# Patient Record
Sex: FEMALE | Race: WHITE | NOT HISPANIC OR LATINO | Employment: FULL TIME | URBAN - METROPOLITAN AREA
[De-identification: names, ages, dates, MRNs, and addresses within clinical notes are randomized per-mention and may not be internally consistent; named-entity substitution may affect disease eponyms.]

---

## 2016-06-13 LAB
PAP RECOMMENDATION EXT: NORMAL
PAP SMEAR: NORMAL

## 2018-01-28 ENCOUNTER — HOSPITAL ENCOUNTER (OUTPATIENT)
Dept: OCCUPATIONAL THERAPY | Facility: HOSPITAL | Age: 23
End: 2018-01-29
Attending: INTERNAL MEDICINE | Admitting: INTERNAL MEDICINE

## 2018-01-28 LAB
ABS NEUT (OLG): 5.42 X10(3)/MCL (ref 2.1–9.2)
ALBUMIN SERPL-MCNC: 3.3 GM/DL (ref 3.4–5)
ALBUMIN/GLOB SERPL: 0.9 {RATIO}
ALP SERPL-CCNC: 115 UNIT/L (ref 38–126)
ALT SERPL-CCNC: 18 UNIT/L (ref 12–78)
AST SERPL-CCNC: 23 UNIT/L (ref 15–37)
BASOPHILS # BLD AUTO: 0 X10(3)/MCL (ref 0–0.2)
BASOPHILS NFR BLD AUTO: 0 %
BILIRUB SERPL-MCNC: 0.5 MG/DL (ref 0.2–1)
BILIRUBIN DIRECT+TOT PNL SERPL-MCNC: 0.1 MG/DL (ref 0–0.2)
BILIRUBIN DIRECT+TOT PNL SERPL-MCNC: 0.4 MG/DL (ref 0–0.8)
BNP BLD-MCNC: 8 PG/ML (ref 0–125)
BUN SERPL-MCNC: 6 MG/DL (ref 7–18)
CALCIUM SERPL-MCNC: 8 MG/DL (ref 8.5–10.1)
CHLORIDE SERPL-SCNC: 102 MMOL/L (ref 98–107)
CO2 SERPL-SCNC: 21 MMOL/L (ref 21–32)
CREAT SERPL-MCNC: 0.66 MG/DL (ref 0.55–1.02)
EOSINOPHIL # BLD AUTO: 0.5 X10(3)/MCL (ref 0–0.9)
EOSINOPHIL NFR BLD AUTO: 6 %
ERYTHROCYTE [DISTWIDTH] IN BLOOD BY AUTOMATED COUNT: 12 % (ref 11.5–17)
FLUAV AG NPH QL IA: NEGATIVE
FLUBV AG NPH QL IA: NEGATIVE
GLOBULIN SER-MCNC: 3.8 GM/DL (ref 2.4–3.5)
GLUCOSE SERPL-MCNC: 87 MG/DL (ref 74–106)
HCT VFR BLD AUTO: 37.5 % (ref 37–47)
HGB BLD-MCNC: 12.6 GM/DL (ref 12–16)
LYMPHOCYTES # BLD AUTO: 2 X10(3)/MCL (ref 0.6–4.6)
LYMPHOCYTES NFR BLD AUTO: 23 %
MCH RBC QN AUTO: 31.8 PG (ref 27–31)
MCHC RBC AUTO-ENTMCNC: 33.6 GM/DL (ref 33–36)
MCV RBC AUTO: 94.7 FL (ref 80–94)
MONOCYTES # BLD AUTO: 0.7 X10(3)/MCL (ref 0.1–1.3)
MONOCYTES NFR BLD AUTO: 8 %
NEUTROPHILS # BLD AUTO: 5.42 X10(3)/MCL (ref 1.4–7.9)
NEUTROPHILS NFR BLD AUTO: 62 %
PLATELET # BLD AUTO: 259 X10(3)/MCL (ref 130–400)
PMV BLD AUTO: 9.7 FL (ref 9.4–12.4)
POC BETA-HCG (QUAL): NEGATIVE
POTASSIUM SERPL-SCNC: 3.3 MMOL/L (ref 3.5–5.1)
PROT SERPL-MCNC: 7.1 GM/DL (ref 6.4–8.2)
RBC # BLD AUTO: 3.96 X10(6)/MCL (ref 4.2–5.4)
SODIUM SERPL-SCNC: 134 MMOL/L (ref 136–145)
WBC # SPEC AUTO: 8.8 X10(3)/MCL (ref 4.5–11.5)

## 2018-01-29 LAB
ALBUMIN SERPL-MCNC: 3.2 GM/DL (ref 3.4–5)
ALBUMIN/GLOB SERPL: 1 {RATIO}
ALP SERPL-CCNC: 112 UNIT/L (ref 38–126)
ALT SERPL-CCNC: 22 UNIT/L (ref 12–78)
AST SERPL-CCNC: 21 UNIT/L (ref 15–37)
BILIRUB SERPL-MCNC: 0.2 MG/DL (ref 0.2–1)
BILIRUBIN DIRECT+TOT PNL SERPL-MCNC: 0.1 MG/DL (ref 0–0.2)
BILIRUBIN DIRECT+TOT PNL SERPL-MCNC: 0.1 MG/DL (ref 0–0.8)
BUN SERPL-MCNC: 13 MG/DL (ref 7–18)
CALCIUM SERPL-MCNC: 8.9 MG/DL (ref 8.5–10.1)
CHLORIDE SERPL-SCNC: 107 MMOL/L (ref 98–107)
CO2 SERPL-SCNC: 20 MMOL/L (ref 21–32)
CREAT SERPL-MCNC: 0.61 MG/DL (ref 0.55–1.02)
ERYTHROCYTE [DISTWIDTH] IN BLOOD BY AUTOMATED COUNT: 12.4 % (ref 11.5–17)
GLOBULIN SER-MCNC: 3.3 GM/DL (ref 2.4–3.5)
GLUCOSE SERPL-MCNC: 165 MG/DL (ref 74–106)
HCT VFR BLD AUTO: 36.4 % (ref 37–47)
HGB BLD-MCNC: 12.3 GM/DL (ref 12–16)
MCH RBC QN AUTO: 32.2 PG (ref 27–31)
MCHC RBC AUTO-ENTMCNC: 33.8 GM/DL (ref 33–36)
MCV RBC AUTO: 95.3 FL (ref 80–94)
PLATELET # BLD AUTO: 262 X10(3)/MCL (ref 130–400)
PMV BLD AUTO: 10.2 FL (ref 9.4–12.4)
POTASSIUM SERPL-SCNC: 5 MMOL/L (ref 3.5–5.1)
PROT SERPL-MCNC: 6.5 GM/DL (ref 6.4–8.2)
RBC # BLD AUTO: 3.82 X10(6)/MCL (ref 4.2–5.4)
SODIUM SERPL-SCNC: 137 MMOL/L (ref 136–145)
WBC # SPEC AUTO: 8.7 X10(3)/MCL (ref 4.5–11.5)

## 2022-04-30 NOTE — DISCHARGE SUMMARY
Patient:   Roseanne Donald            MRN: [id number]            FIN: [id number]               Age:   22 years     Sex:  Female     :  1995   Associated Diagnoses:   None   Author:   Romario LYLE, Precious Andres      Results Review   General results   Today's results   2018 3:52 CST       WBC                       8.7 x10(3)/mcL                             RBC                       3.82 x10(6)/mcL  LOW                             Hgb                       12.3 gm/dL                             Hct                       36.4 %  LOW                             Platelet                  262 x10(3)/mcL                             MCV                       95.3 fL  HI                             MCH                       32.2 pg  HI                             MCHC                      33.8 gm/dL                             RDW                       12.4 %                             MPV                       10.2 fL                             Sodium Lvl                137 mmol/L                             Potassium Lvl             5.0 mmol/L                             Chloride                  107 mmol/L                             CO2                       20.0 mmol/L  LOW                             Calcium Lvl               8.9 mg/dL                             Glucose Lvl               165 mg/dL  HI                             BUN                       13.0 mg/dL                             Creatinine                0.61 mg/dL                             eGFR-AA                   >60 mL/min/1.73 m2  NA                             eGFR-ANNA MARIE                  >60 mL/min/1.73 m2  NA                             Bili Total                0.2 mg/dL                             Bili Direct               0.10 mg/dL                             Bili Indirect             0.10 mg/dL                             AST                       21 unit/L                             ALT                       22  "unit/L                             Alk Phos                  112 unit/L                             Total Protein             6.5 gm/dL                             Albumin Lvl               3.20 gm/dL  LOW                             Globulin                  3.30 gm/dL                             A/G Ratio                 1.0  NA        Discharge Information   Discharge Summary Information:  Admitted  1/28/2018, Discharged  1/29/2018.       Physical Examination      Vital Signs (last 24 hrs)_____  Last Charted___________  Temp Oral     36.5 DegC  (JAN 29 07:00)  Heart Rate Peripheral   89 bpm  (JAN 29 08:28)  Resp Rate         18 br/min  (JAN 29 07:00)  SBP      100 mmHg  (JAN 29 07:00)  DBP      62 mmHg  (JAN 29 07:00)  SpO2      94 %  (JAN 29 08:20)     General:  Alert and oriented, no acute distress,  Neck:  Supple, Non-tender.    Respiratory:   CTA bilateraly for most parts except some scattered wheeze  Cardiovascular:  Normal rate, Regular rhythm, S1, S2, No gallop, Normal peripheral perfusion.    Gastrointestinal:  Soft, Non-tender, Non-distended, Normal bowel sounds.    Musculoskeletal:  Normal range of motion, Normal strength, No tenderness, No swelling.    Integumentary:  Warm, Dry, Pink.    Neurologic:  Alert, Oriented, Normal sensory, Normal motor function, No focal deficits.          Hospital Course   22 year old  female presents to the ED with reports of difficulty breathing and associated chest tightness and wheezing. Pt has a history of asthma and her symptoms started a day ago. she uses albuterol inhaler which she reports she is "running out". She has no PCP or routine follow up. She comes to the ED for her asthma exacerbations. Labs done in the ED revealed Sodium 134, potassium 3.3, chloride 102; CO2 21.0, glucose 87, BUN 6.0, creatinine 0.66; albumin 3.30, BNP 8, WBCs 8.8, H&H 12.6/37.5, platelets 259.  Influenza A swab and influenza B swab both done in the ED negative.  Chest x-ray done " in the ED revealed no active pulmonary disease.  Patient was given multiple nebulizer treatments as well as IV steroid therapy which provided little relief.  Patient had some hypoxia with O2 saturations dropping into 90% which she was placed on supplemental oxygen therapy which improved her saturations to 9596 percent. She does reports sinus congestion, productive cough with intermittent white sputum.  Patient is admitted to the hospitalist services for further management.  She was continued on IV Solu-Medrol and duo nebs.  Had a significant improvement in her respiratory status and is eager to go home.  She actually can be discharged on a slow prednisone taper.  Will also be given prescription for her nebulizer treatments which she had not been taking because of some lapse in her Medicaid card.  This seems to have resolved and should be able to fill prescriptions now with Medicaid assistance.  Overall she is feeling good and should be discharging home on a slow prednisone taper.  Urged at length on the importance of staying away from secondhand smoke or from smoking.    Time spent: 34 minutes           Discharge Plan   Discharge Summary Plan   Discharge Status: improved.     Discharge instructions given: to patient.     Discharge disposition: discharge to home (into the care of family member, self care).     Prescriptions: continue same medications, written and given to patient, per med rec sheet.     Orders     Orders   Admit/Transfer/Discharge:  Discharge (Order): Home  :  Consult Case Management (Order): 1/29/2018 9:38 CST, PCP set up.     Orders   Pharmacy:  prednisONE 10 mg oral tablet (Prescribe): 10 mg = 1 tab(s), Oral, Daily, 40 mg p.o. twice daily ×2 days  30 mg p.o. twice daily ×2 days  20 mg p.o. twice daily ×2 days  10 mg p.o. twice daily ×2 days  5 mg p.o. twice daily ×2 days then stop, X 10 day(s), # 42 tab(s), 0 Refill(s)  DuoNeb 0.5 mg-2.5 mg/3 mL inhalation solution (Prescribe): 3 mL,  NEB, q4hr, X 30 day(s), # 540 mL, 2 Refill(s)  albuterol CFC free 90 mcg/inh inhalation aerosol with adapter (Discontinue): 1/29/2018 9:40 CST.     Acute asthma exacerbation  -will write a Rx for neb treatments  - PO steroid taper,s low    Respiratory failure - with hypoxia  -resolved     Hyponatremia  -resolved with IVF     Hypokalemia: improved   -potassium chloride 20 meq PO BID x 1 days      Tobacco abuse  -counseled  -recenty quit smoking 3 weeks ago  -urged importance of smoking cessation      Education and Follow-up   Counseled: patient, regarding diagnosis, regarding treatment, regarding medications.     Discharge Planning: Asthma, Adult, Asthma Attack Prevention, Curtis Hagen 2/12/2018 09:45:00.

## 2022-04-30 NOTE — ED PROVIDER NOTES
"   Patient:   Roseanne Donald            MRN: [id number]            FIN: [id number]               Age:   22 years     Sex:  Female     :  1995   Associated Diagnoses:   Asthma exacerbation   Author:   Rajendra LYLE, Sara Boyce      Basic Information   Time seen: Date & time 2018 05:07:00.   History source: Patient.   Arrival mode: Private vehicle, walking.   History limitation: None.   Additional information: Chief Complaint from Nursing Triage Note : Chief Complaint   2018 4:46 CST       Chief Complaint           Complaint of SOB, wheezing.  States had been out of town, did not have her nebulizer with her.  Bilateral wheezing auscultated.  .      History of Present Illness   The patient presents with 21 y/o CF with hx of asthma and anxiety presents to ED c/o wheezing onset 1 day ago. Patent reports associated SOB and chest tightness which feels like an asthma exacerbation. CP also similar to when she has "panic attacks." She also reports associated nasal congestion, productive cough of white sputum. Patient states that she took her inhaler 1 hour ago w/ no relief. Patient denies sinus pain, sore throat, fever, sick contacts or recent antibiotic therapy. She has no history of VTE and is not on birth control or other hormone therapy. Patient states that she did not receive her Flu shot this year. No signs of bleeding. No new foods, medications or environment. No other acute issues. Of note, she quit smoking 3 weeks ago. She has never been hospitalized or intubated for asthma.  The onset was 1  days ago.  The course/duration of symptoms is constant.  The degree at onset was moderate.  The degree at present is moderate.  The exacerbating factor is none.  The relieving factor is none.  Risk factors consist of asthma.  Prior episodes: chronic.  Therapy today: beta-agonist inhaler.  Associated symptoms: chest pain, shortness of breath, productive cough, Nasal congestion, denies fever, denies " nausea, denies vomiting and denies dry cough.        Review of Systems   Constitutional symptoms:  Denies birth control and hormone therapy, No fever,    Skin symptoms:  No rash,    Eye symptoms:  No recent vision problems,    ENMT symptoms:  Nasal congestion, no sore throat, no sinus pain.    Respiratory symptoms:  Shortness of breath, cough, wheezing, No hemoptysis, , Sputum production: white.    Cardiovascular symptoms:  Chest pain, no syncope, no peripheral edema.    Gastrointestinal symptoms:  No abdominal pain, no nausea, no vomiting, no diarrhea, no constipation, no rectal bleeding.    Genitourinary symptoms:  No dysuria, no hematuria, no vaginal bleeding.    Musculoskeletal symptoms:  No back pain,    Neurologic symptoms:  No headache, no altered level of consciousness.       Health Status   Allergies: No known allergies.   Medications:  (Selected)   Inpatient Medications  Ordered  Vistaril: 50 mg, form: Injection, IM, Once, first dose 05/12/17 17:01:00 CDT, stop date 05/12/17 17:01:00 CDT, STAT, IM Admin. Only, 24  proparacaine 0.5% ophthalmic solution: 2 drop(s), form: Soln-Opth, OPTH, Once, first dose 07/12/16 21:26:00 CDT, stop date 07/12/16 21:26:00 CDT, STAT  Prescriptions  Prescribed  albuterol CFC free 90 mcg/inh inhalation aerosol with adapter: 2 puff(s), INH, q4hr, PRN PRN for wheezing, # 1 EA, 5 Refill(s), Pharmacy: [id number].   Immunizations: Flu not up to date.      Past Medical/ Family/ Social History   Medical history:    Active  Asthma ([id number]): Onset in 2001 at 6 years.  Resolved  Pregnant ([id number]):  Resolved on 8/19/2013 at 18 years., Reviewed as documented in chart, anxiety, panic attack disorder.   Surgical history: Negative.   Family history:    Asthma  Father  Hypertension  Father  Glaucoma  Father  , Reviewed as documented in chart.   Social history: Alcohol use: Occasionally, Tobacco use: Quit 3 weeks ago, Drug use: Denies, Family/social situation: Intact  family.      Physical Examination               Vital Signs   Vital Signs   1/28/2018 4:46 CST       Temperature Oral          37.4 DegC                             Temperature Oral (calculated)             99.32 DegF                             Peripheral Pulse Rate     105 bpm  HI                             Respiratory Rate          20 br/min                             SpO2                      99 %                             Oxygen Therapy            Room air                             Systolic Blood Pressure   104 mmHg                             Diastolic Blood Pressure  65 mmHg  .   Measurements   1/28/2018 4:46 CST       Weight Dosing             51.5 kg                             Weight Measured and Calculated in Lbs     113.54 lb                             Weight Estimated          51.5 kg                             Height/Length Dosing      163 cm                             Height/Length Estimated   163 cm                             Body Mass Index Estimated 19.38 kg/m2  .   Basic Oxygen Information   1/28/2018 4:46 CST       SpO2                      99 %                             Oxygen Therapy            Room air  .   General:  Alert, mild distress, Patient has mildly labored respirations with audible wheezing. +accessory muscle use.    Skin:  Warm, dry, pink, intact, no pallor, no rash.    Head:  Normocephalic, atraumatic.    Neck:  Supple, trachea midline.    Eye:  Extraocular movements are intact, normal conjunctiva.    Ears, nose, mouth and throat:  Oral mucosa moist, no pharyngeal erythema or exudate, No TTP to the sinuses.    Cardiovascular:  Regular rate and rhythm, Normal peripheral perfusion, No edema.    Respiratory:  Breath sounds are equal, Symmetrical chest wall expansion, Respirations: Regular, respiratory distress mild, labored, prolonged, Breath sounds: Bilateral, wheezes present (inspiratory wheezes, expiratory wheezes, diffuse), Retractions: Mild, Clubbing of nails: None.     Gastrointestinal:  Soft, Nontender, Non distended, Normal bowel sounds.    Genitourinary:  No tenderness.   Back:  Normal range of motion.   Musculoskeletal:  Normal ROM, normal strength, No calf edema, asymmetry, erythema, warmth, tenderness to palpation or palpable cords appreciated bilaterally.    Neurological:  Alert and oriented to person, place, time, and situation, No focal neurological deficit observed, normal motor observed.    Psychiatric:  Cooperative.      Medical Decision Making   Differential Diagnosis:  Wheezing, asthma exacerbation, bronchitis, upper respiratory infection, anaphylaxis, pneumonia, congestive heart failure, smoke inhalation.    Rationale:  21 yo F with history of asthma, reportedly quit smoking, having difficulty finding primary care for asthma management presents with acute ashtma exacerbation. She is afebrile, hemodynamically stable with diffuse wheezing, No evidence of DVT and she is not pale. CXR, Rapid flu, duoneb and solu medrol given. Reassess.   Documents reviewed:  Emergency department nurses' notes.   Electrocardiogram:  Time 1/28/2018 05:28:00, rate 107, normal sinus rhythm, No ST-T changes, no ectopy, normal PA & QRS intervals, EP Interp.    Results review:  Lab results : Lab View   1/28/2018 5:45 CST       Sodium Lvl                134 mmol/L  LOW                             Potassium Lvl             3.3 mmol/L  LOW                             Chloride                  102 mmol/L                             CO2                       21.0 mmol/L                             Calcium Lvl               8.0 mg/dL  LOW                             Glucose Lvl               87 mg/dL                             BUN                       6.0 mg/dL  LOW                             Creatinine                0.66 mg/dL                             eGFR-AA                   >60 mL/min/1.73 m2  NA                             eGFR-ANNA MARIE                  >60 mL/min/1.73 m2  NA                              Bili Total                0.5 mg/dL                             Bili Direct               0.10 mg/dL                             Bili Indirect             0.40 mg/dL                             AST                       23 unit/L                             ALT                       18 unit/L                             Alk Phos                  115 unit/L                             Total Protein             7.1 gm/dL                             Albumin Lvl               3.30 gm/dL  LOW                             Globulin                  3.80 gm/dL  HI                             A/G Ratio                 0.9  NA                             BNP                       8 pg/mL                             WBC                       8.8 x10(3)/mcL                             RBC                       3.96 x10(6)/mcL  LOW                             Hgb                       12.6 gm/dL                             Hct                       37.5 %                             Platelet                  259 x10(3)/mcL                             MCV                       94.7 fL  HI                             MCH                       31.8 pg  HI                             MCHC                      33.6 gm/dL                             RDW                       12.0 %                             MPV                       9.7 fL                             Abs Neut                  5.42 x10(3)/mcL                             Neutro Auto               62 %  NA                             Lymph Auto                23 %  NA                             Mono Auto                 8 %  NA                             Eos Auto                  6 %  NA                             Abs Eos                   0.5 x10(3)/mcL                             Basophil Auto             0 %  NA                             Abs Neutro                5.42 x10(3)/mcL                             Abs Lymph                 2.0 x10(3)/mcL                              Abs Mono                  0.7 x10(3)/mcL                             Abs Baso                  0.0 x10(3)/mcL    1/28/2018 5:35 CST       Influ A Ag                Negative                             Influ B Ag                Negative  .   Chest X-Ray:  Time reported 1/28/2018 06:50:00, no acute disease process, interpretation by Emergency Physician.    Radiology results:  Reported at  1/28/2018 08:30:00, X-ray, chest, reviewed radiologist's report, IMPRESSION: No active pulmonary disease  .       Reexamination/ Reevaluation   Time: 1/28/2018 07:00:00 .   Vital signs   Basic Oxygen Information   1/28/2018 6:45 CST       SpO2                      96 %                             Oxygen Therapy            Room air     Course: improving.   Pain status: unchanged.   Assessment: exam improved, Feels better after duo neb. Still wheezing and feels tight .   Time: 1/28/2018 09:00:00 .   Course: unchanged.   Pain status: unchanged.   Assessment: exam unchanged.   Notes: Patient given 3 duonebs with solu-medrol. Work-up including BNP, rapid flu, and CXR negative for acute findings. Peak flow 250 after treatments which is just over 50% of her suspescted PEF. She is still mildly labred with a cough that sounds productive. Oxygen saturation dropped as low as 90% but is typically higher (94-97%) when she is resting. BP is borderline low, but this is likely her baseline/cuff placement. She was given IVF for hydration. Overall she appears somewhat improved but is still coughing with chest tightness. I will give Magnesium and have consulted hospital medicine for admission for futher management especially consdering she has no PCP follow-up at this time. She is amenble to admssion..      Impression and Plan   Diagnosis   Asthma exacerbation ([id number] J45.901)      Calls-Consults   -  1/28/2018 08:20:00 , Zohreh answered and agrees to admission.    Plan   Condition: Improved, Stable.    Disposition: Admit  time  1/28/2018 08:30:00, Place in Observation Telemetry Unit, Zoey Grace MD.    Counseled: Patient, Regarding diagnosis, Regarding diagnostic results, Regarding treatment plan, Regarding prescription, Patient indicated understanding of instructions.    Notes: IMary acted solely as a scribe for and in the presence of Dr. Drew who performed the service., I, Dr. Sara Drew, personally evaluated and examined this patient and agree with the documentation as above. .

## 2022-04-30 NOTE — H&P
"   Patient:   Roseanne Donald            MRN: [id number]            FIN: [id number]               Age:   22 years     Sex:  Female     :  1995   Associated Diagnoses:   None   Author:   Zoey Grace MD      Basic Information   Source of history:  Self, Medical record.    Present at bedside:  Medical personnel.    Referral source:  Emergency department.    History limitation:  None.    Provider information/ cc:    PCP: NONE  ADVANCED DIRECTIVES: NONE  CODE STATUS: FULL.       Chief Complaint   2018 4:46 CST       Complaint of SOB, wheezing.  States had been out of town, did not have her nebulizer with her.  Bilateral wheezing auscultated.      History of Present Illness   22 year old  female presents to the ED with reports of difficulty breathing and associated chest tightness and wheezing. Pt has a history of asthma and her symptoms started a day ago. she uses albuterol inhaler which she reports she is "running out". She has no PCP or routine follow up. She comes to the ED for her asthma exacerbations. Labs done in the ED revealed Sodium 134, potassium 3.3, chloride 102; CO2 21.0, glucose 87, BUN 6.0, creatinine 0.66; albumin 3.30, BNP 8, WBCs 8.8, H&H 12.6/37.5, platelets 259.  Influenza A swab and influenza B swab both done in the ED negative.  Chest x-ray done in the ED revealed no active pulmonary disease.  Patient was given multiple nebulizer treatments as well as IV steroid therapy which provided little relief.  Patient had some hypoxia with O2 saturations dropping into 90% which she was placed on supplemental oxygen therapy which improved her saturations to 9596 percent. She does reports sinus congestion, productive cough with intermittent white sputum.  Patient is admitted to the hospitalist services for further management.  VS /67 pulse 103 respirations 24 temperature 36.5°C O2 saturation 94% on 2 L per nasal cannula      Review of Systems   Except as document, all " other systems reviewed and negative      Health Status   Allergies:    Allergic Reactions (Selected)  No Known Allergies   Current medications:  (Selected)   Inpatient Medications  Ordered  DuoNeb: 3 mL, form: Soln, NEB, q4hr, first dose 01/28/18 8:59:00 CST  NS (0.9% Sodium Chloride) Infusion 1,000 mL: 1,000 mL, 1,000 mL, IV, 1,000 mL/hr, start date 01/28/18 6:33:00 CST  Norco 5 mg-325 mg oral tablet: 1 tab(s), form: Tab, Oral, q6hr PRN for pain, first dose 01/28/18 8:59:00 CST, mild-moderate  SOLU-Medrol: 60 mg, form: Injection, IV Push, [id number], first dose 01/28/18 9:00:00 CST  Vistaril: 50 mg, form: Injection, IM, Once, first dose 05/12/17 17:01:00 CDT, stop date 05/12/17 17:01:00 CDT, STAT, IM Admin. Only, 24  Zofran: 4 mg, form: Injection, IV Push, q4hr PRN for nausea, first dose 01/28/18 8:59:00 CST  acetaminophen: 1,000 mg, form: Tab, Oral, q6hr PRN for pain, mild, first dose 01/28/18 8:59:00 CST  acetaminophen: 650 mg, form: Tab, Oral, q6hr PRN for fever, first dose 01/28/18 8:59:00 CST, > 38.1 degrees Celsius (100.6 degrees Fahrenheit)  proparacaine 0.5% ophthalmic solution: 2 drop(s), form: Soln-Opth, OPTH, Once, first dose 07/12/16 21:26:00 CDT, stop date 07/12/16 21:26:00 CDT, STAT  Prescriptions  Prescribed  albuterol CFC free 90 mcg/inh inhalation aerosol with adapter: 2 puff(s), INH, q4hr, PRN PRN for wheezing, # 1 EA, 5 Refill(s), Pharmacy: [facility] [id number]      Histories   Past Medical History:   Asthma   Family History:   Father- asthma, HTN,  glaucoma   Procedure history:   Negative   Social History     Drinks beer socially  Denies any illict drug use  Denies any recent tobacco use- quit smoking 3 weeks ago  Lives with family.        Physical Examination      Vital Signs (last 24 hrs)_____  Last Charted___________  Temp Oral     36.6 DegC  (JAN 28 12:00)  Heart Rate Peripheral   94 bpm  (JAN 28 12:00)  Resp Rate         19 br/min  (JAN 28 12:00)  SBP      103 mmHg  (JAN 28  12:00)  DBP      66 mmHg  (JAN 28 12:00)  SpO2      96 %  (JAN 28 12:00)     General:  Alert and oriented, Mild distress, appears stated age, non-toxic, shortness of breath noted.    Eye:  Pupils are equal, round and reactive to light, Extraocular movements are intact, Vision unchanged.    HENT:  Normocephalic, Oral mucosa is moist, No pharyngeal erythema.    Neck:  Supple, Non-tender.    Respiratory:          Respirations: Labored (mild).         Breath sounds: Bilateral, Lower lobe, Base, Diminished.         Breath sounds: Bilateral, Anterior, Posterior, coarse breath sounds.         Breath sounds: Bilateral, Inspiratory wheezes, Expiratory wheezes, audible wheezing.         Support: Oxygen ( 2  L/min ), Oxygen delivery method.    Cardiovascular:  Normal rate, Regular rhythm, S1, S2, No gallop, Normal peripheral perfusion.         Capillary refill: Less than 2 seconds.    Gastrointestinal:  Soft, Non-tender, Non-distended, Normal bowel sounds.    Genitourinary:  No costovertebral angle tenderness.    Musculoskeletal:  Normal range of motion, Normal strength, No tenderness, No swelling.    Integumentary:  Warm, Dry, Pink.    Neurologic:  Alert, Oriented, Normal sensory, Normal motor function, No focal deficits.    Psychiatric:  Cooperative, Appropriate mood & affect.    Cognition and Speech:  Speech clear and coherent.       Review / Management   Results review:     Labs (Last four charted values)  Glucose              87 (JAN 28) .    Laboratory Results   Today's Lab Results : PowerNote Discrete Results   1/28/2018 5:45 CST       WBC                       8.8 x10(3)/mcL                             RBC                       3.96 x10(6)/mcL  LOW                             Hgb                       12.6 gm/dL                             Hct                       37.5 %                             Platelet                  259 x10(3)/mcL                             MCV                       94.7 fL  HI                              MCH                       31.8 pg  HI                             MCHC                      33.6 gm/dL                             RDW                       12.0 %                             MPV                       9.7 fL                             Abs Neut                  5.42 x10(3)/mcL                             Neutro Auto               62 %  NA                             Lymph Auto                23 %  NA                             Mono Auto                 8 %  NA                             Eos Auto                  6 %  NA                             Abs Eos                   0.5 x10(3)/mcL                             Basophil Auto             0 %  NA                             Abs Neutro                5.42 x10(3)/mcL                             Abs Lymph                 2.0 x10(3)/mcL                             Abs Mono                  0.7 x10(3)/mcL                             Abs Baso                  0.0 x10(3)/mcL                             Sodium Lvl                134 mmol/L  LOW                             Potassium Lvl             3.3 mmol/L  LOW                             Chloride                  102 mmol/L                             CO2                       21.0 mmol/L                             Calcium Lvl               8.0 mg/dL  LOW                             Glucose Lvl               87 mg/dL                             BUN                       6.0 mg/dL  LOW                             Creatinine                0.66 mg/dL                             eGFR-AA                   >60 mL/min/1.73 m2  NA                             eGFR-ANNA MARIE                  >60 mL/min/1.73 m2  NA                             Bili Total                0.5 mg/dL                             Bili Direct               0.10 mg/dL                             Bili Indirect             0.40 mg/dL                             AST                       23 unit/L                             ALT                        18 unit/L                             Alk Phos                  115 unit/L                             Total Protein             7.1 gm/dL                             Albumin Lvl               3.30 gm/dL  LOW                             Globulin                  3.80 gm/dL  HI                             A/G Ratio                 0.9  NA                             BNP                       8 pg/mL        Chest x-ray results   Reviewed radiologist's report,     CXR 1 view     HISTORY:  Cough     FINDINGS:  Examination reveals mediastinal and cardiac silhouettes to be within  normal limits. Lung fields are clear and free of gross infiltrates  atelectases or effusions     IMPRESSION: No active pulmonary disease       Signature Line  Electronically Signed By: Rodolfo Cotton MD  Date/Time Signed: 01/28/2018 08:30   Documentation reviewed:  Reviewed prior records, Reviewed home medications.    Condition:  Stable.       Impression and Plan   Diagnosis       Acute asthma exacerbation  -scheduled neb treatments  - IV steroid therapy    Respiratory failure - with hypoxia  -supplemental O2 therapy keep saturation 92% or greater  -monitor pulse ox    Hyponatremia  -IV hydration  -repeat labs in the am    Hypokalemia  -potassium chloride 20 meq PO BID x 1 days  -repeat labs in the am    Tobacco abuse  -counseled  -recenty quit smoking 3 weeks ago  -urged importance of smoking cessation        FULL CODE STATUS  GI/DVT PROPHYLAXIS  PCP: Zohreh WOOTEN APRN, FNP, discussed the case with Dr. JULISSA Grace. .     Orders     Orders   Laboratory:  CMP (Order): Routine collect, 1/29/2018 5:00 CST, Blood, Lab Collect, 1/29/2018 5:00 CST  CBC wo/Diff (Order): Routine collect, 1/29/2018 5:00 CST, Blood, Lab Collect, 1/29/2018 5:00 CST.     Orders   Pharmacy:  potassium chloride 20 mEq oral TABLET extended release (Order): 20 mEq, form: Tab-ER, Oral, BID, Order duration: 3 day(s), first dose 1/28/2018  21:00 CST, stop date 1/31/2018 20:59 CST.     Education and Follow-up:       Counseled: Patient, Regarding diagnosis, Regarding treatment, Regarding medications.    crow Cano care of this patient at 3:30 PM   For this patient encounter I reviewed NP's documentation, treatment plan and medical decision making.  I had a face-to-face time with this patient  22-year-old white female presented to the ER with complaints of wheezing and difficulty breathing.  Patient reports that all her symptoms started 1 or 2 days ago.  Chest x-ray done in the ER was negative patient was given multiple breathing treatments as well as IV steroids with minimal relief so she was admitted to hospitalist service for further management and care  Gen. alert awake oriented  Chest bilateral wheezing    Plan    We will continue with Solu-Medrol 60 mg IV every 8, Duo neb every 4 hours.  Patient will also be given 20 mEq by mouth twice a day of K-Dur ×1 day

## 2023-04-12 ENCOUNTER — HOSPITAL ENCOUNTER (EMERGENCY)
Facility: HOSPITAL | Age: 28
Discharge: HOME | End: 2023-04-12
Attending: FAMILY MEDICINE
Payer: MEDICAID

## 2023-04-12 VITALS
HEART RATE: 63 BPM | OXYGEN SATURATION: 100 % | RESPIRATION RATE: 18 BRPM | DIASTOLIC BLOOD PRESSURE: 66 MMHG | HEIGHT: 65 IN | SYSTOLIC BLOOD PRESSURE: 107 MMHG | TEMPERATURE: 98 F

## 2023-04-12 DIAGNOSIS — J45.909 ASTHMA, UNSPECIFIED ASTHMA SEVERITY, UNSPECIFIED WHETHER COMPLICATED, UNSPECIFIED WHETHER PERSISTENT: ICD-10-CM

## 2023-04-12 DIAGNOSIS — Z76.0 ENCOUNTER FOR MEDICATION REFILL: Primary | ICD-10-CM

## 2023-04-12 PROCEDURE — 99282 EMERGENCY DEPT VISIT SF MDM: CPT

## 2023-04-12 RX ORDER — ALBUTEROL SULFATE 90 UG/1
2 AEROSOL, METERED RESPIRATORY (INHALATION) EVERY 4 HOURS PRN
COMMUNITY
Start: 2022-09-26 | End: 2023-04-12 | Stop reason: SDUPTHER

## 2023-04-12 RX ORDER — IBUPROFEN 200 MG
16 TABLET ORAL
COMMUNITY
End: 2023-04-12 | Stop reason: CLARIF

## 2023-04-12 RX ORDER — ALBUTEROL SULFATE 90 UG/1
2 AEROSOL, METERED RESPIRATORY (INHALATION) EVERY 6 HOURS PRN
Qty: 18 G | Refills: 1 | Status: SHIPPED | OUTPATIENT
Start: 2023-04-12 | End: 2023-05-22 | Stop reason: SDUPTHER

## 2023-04-12 NOTE — DISCHARGE INSTRUCTIONS
It is important that you follow up with your primary care provider or specialist if indicated for further evaluation, workup, and treatment as necessary. The exam and treatment you received in Emergency Department was for an urgent problem and NOT INTENDED AS COMPLETE CARE. It is important that you FOLLOW UP with a doctor for ongoing care. If your symptoms become WORSE or you DO NOT IMPROVE and you are unable to reach your health care provider, you should RETURN to the Emergency Department.

## 2023-04-16 ENCOUNTER — OFFICE VISIT (OUTPATIENT)
Dept: URGENT CARE | Facility: CLINIC | Age: 28
End: 2023-04-16
Payer: MEDICAID

## 2023-04-16 VITALS
BODY MASS INDEX: 19.63 KG/M2 | RESPIRATION RATE: 16 BRPM | OXYGEN SATURATION: 97 % | TEMPERATURE: 103 F | HEART RATE: 98 BPM | HEIGHT: 64 IN | SYSTOLIC BLOOD PRESSURE: 109 MMHG | DIASTOLIC BLOOD PRESSURE: 73 MMHG | WEIGHT: 115 LBS

## 2023-04-16 DIAGNOSIS — R50.9 FEVER, UNSPECIFIED FEVER CAUSE: ICD-10-CM

## 2023-04-16 DIAGNOSIS — R05.9 COUGH, UNSPECIFIED TYPE: ICD-10-CM

## 2023-04-16 DIAGNOSIS — J06.9 UPPER RESPIRATORY TRACT INFECTION, UNSPECIFIED TYPE: Primary | ICD-10-CM

## 2023-04-16 DIAGNOSIS — R52 GENERALIZED BODY ACHES: ICD-10-CM

## 2023-04-16 DIAGNOSIS — J02.9 SORE THROAT: ICD-10-CM

## 2023-04-16 DIAGNOSIS — R51.9 NONINTRACTABLE HEADACHE, UNSPECIFIED CHRONICITY PATTERN, UNSPECIFIED HEADACHE TYPE: ICD-10-CM

## 2023-04-16 LAB
CTP QC/QA: YES
MOLECULAR STREP A: NEGATIVE
POC MOLECULAR INFLUENZA A AGN: NEGATIVE
POC MOLECULAR INFLUENZA B AGN: NEGATIVE
SARS-COV-2 RDRP RESP QL NAA+PROBE: NEGATIVE

## 2023-04-16 PROCEDURE — 87651 STREP A DNA AMP PROBE: CPT | Mod: PBBFAC

## 2023-04-16 PROCEDURE — 87502 INFLUENZA DNA AMP PROBE: CPT | Mod: PBBFAC

## 2023-04-16 PROCEDURE — 99214 OFFICE O/P EST MOD 30 MIN: CPT | Mod: PBBFAC

## 2023-04-16 PROCEDURE — 99213 OFFICE O/P EST LOW 20 MIN: CPT | Mod: S$PBB,,,

## 2023-04-16 PROCEDURE — 99213 PR OFFICE/OUTPT VISIT, EST, LEVL III, 20-29 MIN: ICD-10-PCS | Mod: S$PBB,,,

## 2023-04-16 PROCEDURE — 87635 SARS-COV-2 COVID-19 AMP PRB: CPT | Mod: PBBFAC

## 2023-04-16 RX ORDER — LINACLOTIDE 145 UG/1
145 CAPSULE, GELATIN COATED ORAL
COMMUNITY
Start: 2023-03-16 | End: 2023-05-22 | Stop reason: SDUPTHER

## 2023-04-16 RX ORDER — HYDROXYZINE PAMOATE 50 MG/1
50 CAPSULE ORAL 2 TIMES DAILY
COMMUNITY
Start: 2023-03-16

## 2023-04-16 RX ORDER — LORATADINE 10 MG/1
10 TABLET ORAL DAILY
Qty: 30 TABLET | Refills: 0 | Status: SHIPPED | OUTPATIENT
Start: 2023-04-16 | End: 2023-05-22 | Stop reason: SDUPTHER

## 2023-04-16 RX ORDER — FLUTICASONE PROPIONATE 50 MCG
2 SPRAY, SUSPENSION (ML) NASAL DAILY
Qty: 18.2 ML | Refills: 0 | Status: SHIPPED | OUTPATIENT
Start: 2023-04-16 | End: 2023-05-22

## 2023-04-16 RX ORDER — BUSPIRONE HYDROCHLORIDE 10 MG/1
10 TABLET ORAL 3 TIMES DAILY
COMMUNITY

## 2023-04-16 RX ORDER — PROMETHAZINE HYDROCHLORIDE AND DEXTROMETHORPHAN HYDROBROMIDE 6.25; 15 MG/5ML; MG/5ML
5 SYRUP ORAL EVERY 6 HOURS PRN
Qty: 118 ML | Refills: 0 | Status: SHIPPED | OUTPATIENT
Start: 2023-04-16 | End: 2023-05-22 | Stop reason: ALTCHOICE

## 2023-04-16 RX ORDER — GUAIFENESIN/DEXTROMETHORPHAN 100-10MG/5
5 SYRUP ORAL EVERY 6 HOURS PRN
Qty: 118 ML | Refills: 0 | Status: SHIPPED | OUTPATIENT
Start: 2023-04-16 | End: 2023-05-22

## 2023-04-16 RX ORDER — SERTRALINE HYDROCHLORIDE 100 MG/1
100 TABLET, FILM COATED ORAL
COMMUNITY
End: 2023-05-22

## 2023-04-16 NOTE — LETTER
April 16, 2023      [facility]  [address] [address]  Phone: [phone]       Patient: Roseanne Donald   YOB: 1995  Date of Visit: 04/16/2023    To Whom It May Concern:    Darron Donald  was at [facility] on 04/16/2023. The patient may return to work/school on 04/19/2023 with no restrictions. If you have any questions or concerns, or if I can be of further assistance, please do not hesitate to contact me.    Sincerely,      Jenny Odell NP

## 2023-04-19 NOTE — PROGRESS NOTES
"Subjective:      Patient ID: Roseanne Donald is a 28 y.o. female.    Vitals:  height is 5' 4.17" (1.63 m) and weight is 52.2 kg (115 lb). Her oral temperature is 103.1 °F (39.5 °C) (abnormal). Her blood pressure is 109/73 and her pulse is 98. Her respiration is 16 and oxygen saturation is 97%.     Chief Complaint: Headache, Generalized Body Aches, Cough, and Nausea    PT states headache, fever, body aches, cough and nausea starting today. States coworker was sick with same symptoms.     Headache   Associated symptoms include coughing, a fever and nausea.   Cough  Associated symptoms include a fever and headaches.   Nausea  Associated symptoms include congestion, coughing, a fever, headaches and nausea.     Constitution: Positive for fever.   HENT:  Positive for congestion.    Neck: neck negative.   Cardiovascular: Negative.    Eyes: Negative.    Respiratory:  Positive for cough.    Gastrointestinal:  Positive for nausea.   Genitourinary: Negative.    Musculoskeletal: Negative.    Skin: Negative.    Neurological:  Positive for headaches.    Objective:     Physical Exam   Constitutional: She is oriented to person, place, and time. She appears ill. normal  HENT:   Head: Normocephalic.   Ears:   Right Ear: Tympanic membrane, external ear and ear canal normal.   Left Ear: Tympanic membrane, external ear and ear canal normal.   Nose: Nose normal.   Mouth/Throat: Uvula is midline, oropharynx is clear and moist and mucous membranes are normal. Mucous membranes are moist. Oropharynx is clear.   Eyes: Pupils are equal, round, and reactive to light.   Cardiovascular: Normal rate, regular rhythm, normal heart sounds and normal pulses.   Pulmonary/Chest: Effort normal and breath sounds normal.   Abdominal: Soft.   Musculoskeletal: Normal range of motion.         General: Normal range of motion.   Neurological: She is alert and oriented to person, place, and time.   Skin: Skin is warm and dry.   Vitals reviewed.  Results for " orders placed or performed in visit on 04/16/23   POCT COVID-19 Rapid Screening   Result Value Ref Range    POC Rapid COVID Negative Negative     Acceptable Yes    POCT Strep A, Molecular   Result Value Ref Range    Molecular Strep A, POC Negative Negative     Acceptable Yes    POCT Influenza A/B Molecular   Result Value Ref Range    POC Molecular Influenza A Ag Negative Negative, Not Reported    POC Molecular Influenza B Ag Negative Negative, Not Reported     Acceptable Yes        Assessment:     1. Upper respiratory tract infection, unspecified type    2. Fever, unspecified fever cause    3. Generalized body aches    4. Sore throat    5. Nonintractable headache, unspecified chronicity pattern, unspecified headache type    6. Cough, unspecified type        Plan:       Upper respiratory tract infection, unspecified type  -     fluticasone propionate (FLONASE) 50 mcg/actuation nasal spray; 2 sprays (100 mcg total) by Each Nostril route once daily.  Dispense: 18.2 mL; Refill: 0  -     loratadine (CLARITIN) 10 mg tablet; Take 1 tablet (10 mg total) by mouth once daily.  Dispense: 30 tablet; Refill: 0  -     dextromethorphan-guaiFENesin  mg/5 ml (ROBITUSSIN-DM)  mg/5 mL liquid; Take 5 mLs by mouth every 6 (six) hours as needed (cough).  Dispense: 118 mL; Refill: 0  -     promethazine-dextromethorphan (PROMETHAZINE-DM) 6.25-15 mg/5 mL Syrp; Take 5 mLs by mouth every 6 (six) hours as needed (cough).  Dispense: 118 mL; Refill: 0    Fever, unspecified fever cause  -     POCT COVID-19 Rapid Screening  -     POCT Strep A, Molecular  -     POCT Influenza A/B Molecular    Generalized body aches  -     POCT COVID-19 Rapid Screening  -     POCT Influenza A/B Molecular    Sore throat  -     POCT COVID-19 Rapid Screening  -     POCT Strep A, Molecular    Nonintractable headache, unspecified chronicity pattern, unspecified headache type  -     POCT COVID-19 Rapid Screening  -      POCT Influenza A/B Molecular    Cough, unspecified type  -     POCT COVID-19 Rapid Screening  -     POCT Influenza A/B Molecular      Please drink plenty of fluids.  Please get plenty of rest.    Take over the counter Tylenol (Acetaminophen) and/or Motrin (Ibuprofen) as directed for control of pain and/or fever.  Please follow up with your primary care doctor.     ER precautions given, patient verbalized understanding.     Please see provided patient education for guidance.    Follow up with PCP or return to clinic if symptoms worsen or do not improve.

## 2023-05-22 ENCOUNTER — LAB VISIT (OUTPATIENT)
Dept: LAB | Facility: HOSPITAL | Age: 28
End: 2023-05-22
Attending: NURSE PRACTITIONER
Payer: MEDICAID

## 2023-05-22 ENCOUNTER — OFFICE VISIT (OUTPATIENT)
Dept: INTERNAL MEDICINE | Facility: CLINIC | Age: 28
End: 2023-05-22
Payer: MEDICAID

## 2023-05-22 VITALS
TEMPERATURE: 99 F | WEIGHT: 121 LBS | HEART RATE: 85 BPM | DIASTOLIC BLOOD PRESSURE: 71 MMHG | RESPIRATION RATE: 18 BRPM | SYSTOLIC BLOOD PRESSURE: 107 MMHG | HEIGHT: 64 IN | BODY MASS INDEX: 20.66 KG/M2

## 2023-05-22 DIAGNOSIS — Z23 PNEUMOCOCCAL VACCINATION ADMINISTERED AT CURRENT VISIT: ICD-10-CM

## 2023-05-22 DIAGNOSIS — F33.2 SEVERE EPISODE OF RECURRENT MAJOR DEPRESSIVE DISORDER, WITHOUT PSYCHOTIC FEATURES: Chronic | ICD-10-CM

## 2023-05-22 DIAGNOSIS — J06.9 UPPER RESPIRATORY TRACT INFECTION, UNSPECIFIED TYPE: ICD-10-CM

## 2023-05-22 DIAGNOSIS — J45.40 MODERATE PERSISTENT ASTHMA WITHOUT COMPLICATION: Primary | Chronic | ICD-10-CM

## 2023-05-22 DIAGNOSIS — Z00.00 WELLNESS EXAMINATION: ICD-10-CM

## 2023-05-22 DIAGNOSIS — Z12.4 CERVICAL CANCER SCREENING: ICD-10-CM

## 2023-05-22 DIAGNOSIS — Z72.0 TOBACCO USER: Chronic | ICD-10-CM

## 2023-05-22 PROBLEM — J45.909 ASTHMA: Chronic | Status: ACTIVE | Noted: 2023-05-22

## 2023-05-22 PROBLEM — J45.909 ASTHMA: Status: ACTIVE | Noted: 2023-05-22

## 2023-05-22 LAB
ALBUMIN SERPL-MCNC: 4.1 G/DL (ref 3.5–5)
ALBUMIN/GLOB SERPL: 1.1 RATIO (ref 1.1–2)
ALP SERPL-CCNC: 95 UNIT/L (ref 40–150)
ALT SERPL-CCNC: 9 UNIT/L (ref 0–55)
AST SERPL-CCNC: 15 UNIT/L (ref 5–34)
BASOPHILS # BLD AUTO: 0.04 X10(3)/MCL
BASOPHILS NFR BLD AUTO: 0.6 %
BILIRUBIN DIRECT+TOT PNL SERPL-MCNC: 0.4 MG/DL
BUN SERPL-MCNC: 15.2 MG/DL (ref 7–18.7)
CALCIUM SERPL-MCNC: 9.6 MG/DL (ref 8.4–10.2)
CHLORIDE SERPL-SCNC: 107 MMOL/L (ref 98–107)
CHOLEST SERPL-MCNC: 153 MG/DL
CHOLEST/HDLC SERPL: 3 {RATIO} (ref 0–5)
CO2 SERPL-SCNC: 23 MMOL/L (ref 22–29)
CREAT SERPL-MCNC: 0.72 MG/DL (ref 0.55–1.02)
EOSINOPHIL # BLD AUTO: 0.38 X10(3)/MCL (ref 0–0.9)
EOSINOPHIL NFR BLD AUTO: 5.9 %
ERYTHROCYTE [DISTWIDTH] IN BLOOD BY AUTOMATED COUNT: 11.9 % (ref 11.5–17)
GFR SERPLBLD CREATININE-BSD FMLA CKD-EPI: >60 MLS/MIN/1.73/M2
GLOBULIN SER-MCNC: 3.6 GM/DL (ref 2.4–3.5)
GLUCOSE SERPL-MCNC: 86 MG/DL (ref 74–100)
HCT VFR BLD AUTO: 43.5 % (ref 37–47)
HCV AB SERPL QL IA: NONREACTIVE
HDLC SERPL-MCNC: 44 MG/DL (ref 35–60)
HGB BLD-MCNC: 14 G/DL (ref 12–16)
HIV 1+2 AB+HIV1 P24 AG SERPL QL IA: NONREACTIVE
IMM GRANULOCYTES # BLD AUTO: 0.02 X10(3)/MCL (ref 0–0.04)
IMM GRANULOCYTES NFR BLD AUTO: 0.3 %
LDLC SERPL CALC-MCNC: 93 MG/DL (ref 50–140)
LYMPHOCYTES # BLD AUTO: 2.93 X10(3)/MCL (ref 0.6–4.6)
LYMPHOCYTES NFR BLD AUTO: 45.9 %
MCH RBC QN AUTO: 30.2 PG (ref 27–31)
MCHC RBC AUTO-ENTMCNC: 32.2 G/DL (ref 33–36)
MCV RBC AUTO: 94 FL (ref 80–94)
MONOCYTES # BLD AUTO: 0.47 X10(3)/MCL (ref 0.1–1.3)
MONOCYTES NFR BLD AUTO: 7.4 %
NEUTROPHILS # BLD AUTO: 2.55 X10(3)/MCL (ref 2.1–9.2)
NEUTROPHILS NFR BLD AUTO: 39.9 %
NRBC BLD AUTO-RTO: 0 %
PLATELET # BLD AUTO: 265 X10(3)/MCL (ref 130–400)
PMV BLD AUTO: 10.4 FL (ref 7.4–10.4)
POTASSIUM SERPL-SCNC: 4.3 MMOL/L (ref 3.5–5.1)
PROT SERPL-MCNC: 7.7 GM/DL (ref 6.4–8.3)
RBC # BLD AUTO: 4.63 X10(6)/MCL (ref 4.2–5.4)
SODIUM SERPL-SCNC: 138 MMOL/L (ref 136–145)
TRIGL SERPL-MCNC: 81 MG/DL (ref 37–140)
TSH SERPL-ACNC: 1.45 UIU/ML (ref 0.35–4.94)
VLDLC SERPL CALC-MCNC: 16 MG/DL
WBC # SPEC AUTO: 6.39 X10(3)/MCL (ref 4.5–11.5)

## 2023-05-22 PROCEDURE — 99406 BEHAV CHNG SMOKING 3-10 MIN: CPT | Mod: S$PBB,,, | Performed by: NURSE PRACTITIONER

## 2023-05-22 PROCEDURE — 90472 IMMUNIZATION ADMIN EACH ADD: CPT | Mod: PBBFAC

## 2023-05-22 PROCEDURE — 1160F PR REVIEW ALL MEDS BY PRESCRIBER/CLIN PHARMACIST DOCUMENTED: ICD-10-PCS | Mod: CPTII,,, | Performed by: NURSE PRACTITIONER

## 2023-05-22 PROCEDURE — 1159F PR MEDICATION LIST DOCUMENTED IN MEDICAL RECORD: ICD-10-PCS | Mod: CPTII,,, | Performed by: NURSE PRACTITIONER

## 2023-05-22 PROCEDURE — 99204 PR OFFICE/OUTPT VISIT, NEW, LEVL IV, 45-59 MIN: ICD-10-PCS | Mod: 25,S$PBB,, | Performed by: NURSE PRACTITIONER

## 2023-05-22 PROCEDURE — 36415 COLL VENOUS BLD VENIPUNCTURE: CPT

## 2023-05-22 PROCEDURE — 99215 OFFICE O/P EST HI 40 MIN: CPT | Mod: PBBFAC | Performed by: NURSE PRACTITIONER

## 2023-05-22 PROCEDURE — 90677 PCV20 VACCINE IM: CPT | Mod: PBBFAC

## 2023-05-22 PROCEDURE — 3008F BODY MASS INDEX DOCD: CPT | Mod: CPTII,,, | Performed by: NURSE PRACTITIONER

## 2023-05-22 PROCEDURE — 84443 ASSAY THYROID STIM HORMONE: CPT

## 2023-05-22 PROCEDURE — 3074F PR MOST RECENT SYSTOLIC BLOOD PRESSURE < 130 MM HG: ICD-10-PCS | Mod: CPTII,,, | Performed by: NURSE PRACTITIONER

## 2023-05-22 PROCEDURE — 3078F PR MOST RECENT DIASTOLIC BLOOD PRESSURE < 80 MM HG: ICD-10-PCS | Mod: CPTII,,, | Performed by: NURSE PRACTITIONER

## 2023-05-22 PROCEDURE — 80053 COMPREHEN METABOLIC PANEL: CPT

## 2023-05-22 PROCEDURE — 87389 HIV-1 AG W/HIV-1&-2 AB AG IA: CPT

## 2023-05-22 PROCEDURE — 3008F PR BODY MASS INDEX (BMI) DOCUMENTED: ICD-10-PCS | Mod: CPTII,,, | Performed by: NURSE PRACTITIONER

## 2023-05-22 PROCEDURE — 90471 IMMUNIZATION ADMIN: CPT | Performed by: NURSE PRACTITIONER

## 2023-05-22 PROCEDURE — 99406 PR TOBACCO USE CESSATION INTERMEDIATE 3-10 MINUTES: ICD-10-PCS | Mod: S$PBB,,, | Performed by: NURSE PRACTITIONER

## 2023-05-22 PROCEDURE — 80061 LIPID PANEL: CPT

## 2023-05-22 PROCEDURE — 3074F SYST BP LT 130 MM HG: CPT | Mod: CPTII,,, | Performed by: NURSE PRACTITIONER

## 2023-05-22 PROCEDURE — 1160F RVW MEDS BY RX/DR IN RCRD: CPT | Mod: CPTII,,, | Performed by: NURSE PRACTITIONER

## 2023-05-22 PROCEDURE — 85025 COMPLETE CBC W/AUTO DIFF WBC: CPT

## 2023-05-22 PROCEDURE — 86803 HEPATITIS C AB TEST: CPT

## 2023-05-22 PROCEDURE — 3078F DIAST BP <80 MM HG: CPT | Mod: CPTII,,, | Performed by: NURSE PRACTITIONER

## 2023-05-22 PROCEDURE — 1159F MED LIST DOCD IN RCRD: CPT | Mod: CPTII,,, | Performed by: NURSE PRACTITIONER

## 2023-05-22 PROCEDURE — 99204 OFFICE O/P NEW MOD 45 MIN: CPT | Mod: 25,S$PBB,, | Performed by: NURSE PRACTITIONER

## 2023-05-22 RX ORDER — METHYLPREDNISOLONE 4 MG/1
TABLET ORAL
Qty: 21 EACH | Refills: 0 | Status: SHIPPED | OUTPATIENT
Start: 2023-05-22

## 2023-05-22 RX ORDER — ALBUTEROL SULFATE 0.83 MG/ML
2.5 SOLUTION RESPIRATORY (INHALATION) EVERY 6 HOURS PRN
COMMUNITY
Start: 2021-04-24

## 2023-05-22 RX ORDER — LINACLOTIDE 145 UG/1
145 CAPSULE, GELATIN COATED ORAL
Qty: 90 CAPSULE | Refills: 2 | Status: SHIPPED | OUTPATIENT
Start: 2023-05-22

## 2023-05-22 RX ORDER — QUETIAPINE FUMARATE 50 MG/1
50 TABLET, FILM COATED ORAL NIGHTLY
COMMUNITY
Start: 2023-05-12

## 2023-05-22 RX ORDER — CARIPRAZINE 1.5 MG/1
1.5 CAPSULE, GELATIN COATED ORAL DAILY
COMMUNITY
Start: 2023-05-12

## 2023-05-22 RX ORDER — QUETIAPINE FUMARATE 200 MG/1
200 TABLET, FILM COATED ORAL NIGHTLY
COMMUNITY
Start: 2023-05-12

## 2023-05-22 RX ORDER — FLUTICASONE PROPIONATE AND SALMETEROL XINAFOATE 45; 21 UG/1; UG/1
2 AEROSOL, METERED RESPIRATORY (INHALATION) EVERY 12 HOURS
Qty: 12 G | Refills: 2 | Status: SHIPPED | OUTPATIENT
Start: 2023-05-22

## 2023-05-22 RX ORDER — ALBUTEROL SULFATE 90 UG/1
2 AEROSOL, METERED RESPIRATORY (INHALATION) EVERY 6 HOURS PRN
Qty: 18 G | Refills: 2 | Status: SHIPPED | OUTPATIENT
Start: 2023-05-22

## 2023-05-22 RX ORDER — PRAZOSIN HYDROCHLORIDE 1 MG/1
1 CAPSULE ORAL NIGHTLY
COMMUNITY
Start: 2023-05-11

## 2023-05-22 RX ORDER — FLUTICASONE PROPIONATE AND SALMETEROL XINAFOATE 45; 21 UG/1; UG/1
2 AEROSOL, METERED RESPIRATORY (INHALATION) DAILY
COMMUNITY
Start: 2021-04-24 | End: 2023-05-22 | Stop reason: SDUPTHER

## 2023-05-22 RX ORDER — BUSPIRONE HYDROCHLORIDE 15 MG/1
15 TABLET ORAL 3 TIMES DAILY
COMMUNITY
Start: 2023-05-11

## 2023-05-22 RX ORDER — IBUPROFEN 800 MG/1
800 TABLET ORAL 3 TIMES DAILY
COMMUNITY
Start: 2023-05-05 | End: 2023-05-22 | Stop reason: SDUPTHER

## 2023-05-22 RX ORDER — CHLORHEXIDINE GLUCONATE ORAL RINSE 1.2 MG/ML
10 SOLUTION DENTAL 2 TIMES DAILY
COMMUNITY
Start: 2023-05-05

## 2023-05-22 RX ORDER — BUPRENORPHINE HYDROCHLORIDE, NALOXONE HYDROCHLORIDE 8; 2 MG/1; MG/1
1 FILM, SOLUBLE BUCCAL; SUBLINGUAL 2 TIMES DAILY
COMMUNITY
Start: 2023-05-15

## 2023-05-22 RX ORDER — IBUPROFEN 800 MG/1
800 TABLET ORAL 3 TIMES DAILY PRN
Qty: 30 TABLET | Refills: 6 | Status: SHIPPED | OUTPATIENT
Start: 2023-05-22

## 2023-05-22 RX ORDER — LORATADINE 10 MG/1
10 TABLET ORAL DAILY
Qty: 90 TABLET | Refills: 2 | Status: SHIPPED | OUTPATIENT
Start: 2023-05-22

## 2023-05-22 NOTE — PROGRESS NOTES
Patient ID: [id number]     Chief Complaint: Establish Care and Asthma    HPI:     Roseanne Donald is a 28 y.o. female with diagnosis of Asthma, Constipation, Nicotine Use (Vapes), Hx of Drug Use. Patient seen in clinic today to establish care.    Patient states diagnosed with Asthma as a child, taking Albuterol prn, but having to take it nightly. Symptoms include SOB, wheezing, coughing. Patient states last PFT done years ago. Patient denies SOB, wheezing currently.   Patient denies any other acute complaints.     Patient states followed by Colt Bain NP (Gordon) for Hx of Drug Use and Depression.     Review of patient's allergies indicates:  No Known Allergies    Breast Cancer Screening: deferred due to age  Cervical Cancer Screening:   Colorectal Cancer Screening: deferred due to age  Diabetic Eye Exam: N/A  Diabetic Foot Exam: N/A  Lung Cancer Screening: deferred due to age  Prostate Cancer Screening: N/A  AAA Screening: N/A  Osteoporosis Screening: deferred due to age  Medicare Wellness: N/A  Immunizations:   Immunization History   Administered Date(s) Administered    Pneumococcal Conjugate - 20 Valent 05/22/2023    Tdap 08/20/2017     History reviewed. No pertinent surgical history.    family history includes Anxiety disorder in her mother; Depression in her mother; Hypertension in her father and paternal grandfather.    Social History     Socioeconomic History    Marital status: Single   Tobacco Use    Smoking status: Every Day     Packs/day: 0.25     Types: Cigarettes    Smokeless tobacco: Never    Tobacco comments:     Smokes 2-3 times a day   Substance and Sexual Activity    Alcohol use: Not Currently    Drug use: Not Currently     Comment: in pass    Sexual activity: Yes     Partners: Male     Birth control/protection: Condom     Current Outpatient Medications   Medication Instructions    albuterol (PROVENTIL) 2.5 mg, Inhalation, Every 6 hours PRN    albuterol (PROVENTIL/VENTOLIN HFA) 90  "mcg/actuation inhaler 2 puffs, Inhalation, Every 6 hours PRN    busPIRone (BUSPAR) 10 mg, Oral, 3 times daily    busPIRone (BUSPAR) 15 mg, Oral, 3 times daily    chlorhexidine (PERIDEX) 0.12 % solution 10 mLs, 2 times daily    fluticasone propion-salmeterol 45-21 mcg/dose (ADVAIR HFA) 45-21 mcg/actuation HFAA inhaler 2 puffs, Inhalation, Every 12 hours    hydrOXYzine pamoate (VISTARIL) 50 mg, Oral, 2 times daily    ibuprofen (ADVIL,MOTRIN) 800 mg, Oral, 3 times daily PRN    LINZESS 145 mcg, Oral, Before breakfast    loratadine (CLARITIN) 10 mg, Oral, Daily    methylPREDNISolone (MEDROL DOSEPACK) 4 mg tablet use as directed    prazosin (MINIPRESS) 1 mg, Oral, Nightly    QUEtiapine (SEROQUEL) 50 mg, Oral, Nightly    QUEtiapine (SEROQUEL) 200 mg, Oral, Nightly    SUBOXONE 8-2 mg 1 Film, Sublingual, 2 times daily    VRAYLAR 1.5 mg, Oral, Daily       Subjective:     Review of Systems   Constitutional: Negative.    HENT: Negative.     Eyes: Negative.    Respiratory: Negative.     Cardiovascular: Negative.    Gastrointestinal: Negative.    Endocrine: Negative.    Genitourinary: Negative.    Musculoskeletal: Negative.    Skin: Negative.    Allergic/Immunologic: Negative.    Neurological: Negative.    Hematological: Negative.    Psychiatric/Behavioral: Negative.       Objective:     Visit Vitals  /71 (BP Location: Right arm, Patient Position: Sitting, BP Method: Medium (Automatic))   Pulse 85   Temp 98.8 °F (37.1 °C) (Oral)   Resp 18   Ht 5' 4.17" (1.63 m)   Wt 54.9 kg (121 lb)   LMP 05/07/2023   BMI 20.66 kg/m²       Physical Exam  Vitals reviewed.   Constitutional:       Appearance: Normal appearance.   HENT:      Head: Normocephalic and atraumatic.      Mouth/Throat:      Mouth: Mucous membranes are moist.      Pharynx: Oropharynx is clear.   Eyes:      Extraocular Movements: Extraocular movements intact.      Conjunctiva/sclera: Conjunctivae normal.      Pupils: Pupils are equal, round, and reactive to light. "   Cardiovascular:      Rate and Rhythm: Normal rate and regular rhythm.      Heart sounds: Normal heart sounds.   Pulmonary:      Effort: Pulmonary effort is normal.      Breath sounds: Wheezing present.   Abdominal:      General: Bowel sounds are normal.   Musculoskeletal:         General: Normal range of motion.      Cervical back: Normal range of motion.   Skin:     General: Skin is warm and dry.   Neurological:      Mental Status: She is alert and oriented to person, place, and time.   Psychiatric:         Mood and Affect: Mood normal.         Behavior: Behavior normal.       Labs Reviewed:     Hematology:  Lab Results   Component Value Date    WBC 11.8 (H) 07/29/2020    HGB 14.7 07/29/2020    HCT 44.0 07/29/2020     (H) 07/29/2020     Chemistry:  Lab Results   Component Value Date     08/03/2020    K 4.4 08/03/2020    CHLORIDE 103 08/03/2020    BUN 16.1 08/03/2020    CREATININE 0.83 08/03/2020    GLUCOSE 86 08/03/2020    CALCIUM 9.7 08/03/2020    ALKPHOS 114 07/29/2020    LABPROT 8.4 (H) 07/29/2020    ALBUMIN 4.7 07/29/2020    BILIDIR 0.2 07/29/2020    IBILI 0.30 07/29/2020    AST 55 (H) 07/29/2020    ALT 25 07/29/2020     No results found for: HGBA1C, MICROALBCREA     Lipid Panel:  Lab Results   Component Value Date    CHOL 146 07/30/2020    HDL 39 (L) 07/30/2020    LDL 83.00 07/30/2020    TRIG 118 07/30/2020    TOTALCHOLEST 4 07/30/2020     Thyroid:  Lab Results   Component Value Date    TSH 2.7671 07/29/2020     Urine:  Lab Results   Component Value Date    APPEARANCEUA Clear 07/29/2020    PHUA 5.5 07/29/2020    PROTEINUA Negative 07/29/2020    LEUKOCYTESUR Negative 07/29/2020    RBCUA None Seen 07/29/2020    WBCUA 0-2 07/29/2020    BACTERIA None Seen 07/29/2020        Assessment:       ICD-10-CM ICD-9-CM   1. Moderate persistent asthma without complication  J45.40 493.90   2. Severe episode of recurrent major depressive disorder, without psychotic features  F33.2 296.33   3. Tobacco user  Z72.0  305.1   4. Cervical cancer screening  Z12.4 V76.2   5. Wellness examination  Z00.00 V70.0   6. Upper respiratory tract infection, unspecified type  J06.9 465.9   7. Pneumococcal vaccination administered at current visit  Z23 V49.89        Plan:     1. Moderate persistent asthma without complication  Wheezing noted  Medrol Dose Pack  Continue Advair, Albuterol  PFTs ordered  - fluticasone propion-salmeterol 45-21 mcg/dose (ADVAIR HFA) 45-21 mcg/actuation HFAA inhaler; Inhale 2 puffs into the lungs every 12 (twelve) hours.  Dispense: 12 g; Refill: 2  - albuterol (PROVENTIL/VENTOLIN HFA) 90 mcg/actuation inhaler; Inhale 2 puffs into the lungs every 6 (six) hours as needed for Wheezing or Shortness of Breath.  Dispense: 18 g; Refill: 2  - Pulmonary function test; Future    2. Severe episode of recurrent major depressive disorder, without psychotic features  Followed by mental health provider  Continue medications as prescribed    3. Tobacco user  Smoking cessation education provided, encouraged. Informed of smoking cessation program. Patient refused smoking cessation at this time. I spent 3 minutes discussing smoking cessation with patient.     4. Cervical cancer screening  - Ambulatory referral/consult to Gynecology; Future    5. Wellness   [id number] vaccine given today  - CBC Auto Differential; Future  - Comprehensive Metabolic Panel; Future  - Lipid Panel; Future  - Urinalysis; Future  - TSH; Future  - Hepatitis C antibody; Future  - HIV 1/2 Ag/Ab (4th Gen); Future  - Urinalysis      Follow up in about 3 weeks (around 6/12/2023) for Labs, Virtual Visit. In addition to their scheduled follow up, the patient has also been instructed to follow up on as needed basis.     MAR Spears

## 2023-06-15 ENCOUNTER — OFFICE VISIT (OUTPATIENT)
Dept: INTERNAL MEDICINE | Facility: CLINIC | Age: 28
End: 2023-06-15
Payer: MEDICAID

## 2023-06-15 DIAGNOSIS — F33.2 SEVERE EPISODE OF RECURRENT MAJOR DEPRESSIVE DISORDER, WITHOUT PSYCHOTIC FEATURES: Chronic | ICD-10-CM

## 2023-06-15 DIAGNOSIS — J45.40 MODERATE PERSISTENT ASTHMA WITHOUT COMPLICATION: Primary | Chronic | ICD-10-CM

## 2023-06-15 PROCEDURE — 1160F PR REVIEW ALL MEDS BY PRESCRIBER/CLIN PHARMACIST DOCUMENTED: ICD-10-PCS | Mod: CPTII,95,, | Performed by: NURSE PRACTITIONER

## 2023-06-15 PROCEDURE — 1159F MED LIST DOCD IN RCRD: CPT | Mod: CPTII,95,, | Performed by: NURSE PRACTITIONER

## 2023-06-15 PROCEDURE — 99213 OFFICE O/P EST LOW 20 MIN: CPT | Mod: FQ,95,, | Performed by: NURSE PRACTITIONER

## 2023-06-15 PROCEDURE — 1160F RVW MEDS BY RX/DR IN RCRD: CPT | Mod: CPTII,95,, | Performed by: NURSE PRACTITIONER

## 2023-06-15 PROCEDURE — 99213 PR OFFICE/OUTPT VISIT, EST, LEVL III, 20-29 MIN: ICD-10-PCS | Mod: FQ,95,, | Performed by: NURSE PRACTITIONER

## 2023-06-15 PROCEDURE — 1159F PR MEDICATION LIST DOCUMENTED IN MEDICAL RECORD: ICD-10-PCS | Mod: CPTII,95,, | Performed by: NURSE PRACTITIONER

## 2023-06-15 NOTE — PROGRESS NOTES
Patient ID: [id number]     Chief Complaint: Follow-up (Asthma is better)    HPI:     Audio Only Telehealth Visit     The patient location is: home  The chief complaint leading to consultation is: follow up asthma  Visit type: Virtual visit with audio only (telephone)  Total time spent with patient: 11 minutes     The reason for the audio only service rather than synchronous audio and video virtual visit was related to technical difficulties or patient preference/necessity.     Each patient to whom I provide medical services by telemedicine is:  (1) informed of the relationship between the physician and patient and the respective role of any other health care provider with respect to management of the patient; and (2) notified that they may decline to receive medical services by telemedicine and may withdraw from such care at any time. Patient verbally consented to receive this service via voice-only telephone call.  This service was not originating from a related E/M service provided within the previous 7 days nor will  to an E/M service or procedure within the next 24 hours or my soonest available appointment.  Prevailing standard of care was able to be met in this audio-only visit.       Roseanne Donald is a 28 y.o. female with diagnosis of Asthma, Constipation, Nicotine Use (Vapes), Hx of Drug Use. Patient seen in clinic today for follow up on asthma. Patient last seen in clinic on 05/22/2023.   At previous appt, patient restarted on Advair due to patient using Albuterol inhaler daily. PFTs ordered, awaiting appt. Patient states SOB improved. Patient states using Adviar bid as prescribed. Patient states using Albuterol inhaler once a week if needed.    Patient denies any other acute complaints.     Patient states followed by Colt Bain NP ([place]) for Hx of Drug Use and Depression.     Review of patient's allergies indicates:  No Known Allergies    Breast Cancer Screening: deferred due to  age  Cervical Cancer Screening: GYN referral ordered  Colorectal Cancer Screening: deferred due to age  Diabetic Eye Exam: N/A  Diabetic Foot Exam: N/A  Lung Cancer Screening: deferred due to age  Prostate Cancer Screening: N/A  AAA Screening: N/A  Osteoporosis Screening: deferred due to age  Medicare Wellness: N/A  Immunizations:   Immunization History   Administered Date(s) Administered    Pneumococcal Conjugate - 20 Valent 2023    Tdap 2017     History reviewed. No pertinent surgical history.    family history includes Anxiety disorder in her mother; Depression in her mother; Hypertension in her father and paternal grandfather.    Social History     Socioeconomic History    Marital status: Single   Tobacco Use    Smoking status: Former     Packs/day: 0.25     Types: Cigarettes     Quit date: 2023     Years since quittin.0    Smokeless tobacco: Never    Tobacco comments:     Smokes 2-3 times a day   Substance and Sexual Activity    Alcohol use: Not Currently    Drug use: Not Currently     Comment: in pass    Sexual activity: Yes     Partners: Male     Birth control/protection: Condom     Current Outpatient Medications   Medication Instructions    albuterol (PROVENTIL) 2.5 mg, Inhalation, Every 6 hours PRN    albuterol (PROVENTIL/VENTOLIN HFA) 90 mcg/actuation inhaler 2 puffs, Inhalation, Every 6 hours PRN    busPIRone (BUSPAR) 10 mg, Oral, 3 times daily    busPIRone (BUSPAR) 15 mg, Oral, 3 times daily    chlorhexidine (PERIDEX) 0.12 % solution 10 mLs, 2 times daily    fluticasone propion-salmeterol 45-21 mcg/dose (ADVAIR HFA) 45-21 mcg/actuation HFAA inhaler 2 puffs, Inhalation, Every 12 hours    hydrOXYzine pamoate (VISTARIL) 50 mg, Oral, 2 times daily    ibuprofen (ADVIL,MOTRIN) 800 mg, Oral, 3 times daily PRN    LINZESS 145 mcg, Oral, Before breakfast    loratadine (CLARITIN) 10 mg, Oral, Daily    methylPREDNISolone (MEDROL DOSEPACK) 4 mg tablet use as directed    prazosin (MINIPRESS) 1 mg,  Oral, Nightly    QUEtiapine (SEROQUEL) 50 mg, Oral, Nightly    QUEtiapine (SEROQUEL) 200 mg, Oral, Nightly    SUBOXONE 8-2 mg 1 Film, Sublingual, 2 times daily    VRAYLAR 1.5 mg, Oral, Daily       Subjective:     Review of Systems   Constitutional: Negative.    HENT: Negative.     Eyes: Negative.    Respiratory: Negative.     Cardiovascular: Negative.    Gastrointestinal: Negative.    Endocrine: Negative.    Genitourinary: Negative.    Musculoskeletal: Negative.    Skin: Negative.    Allergic/Immunologic: Negative.    Neurological: Negative.    Hematological: Negative.    Psychiatric/Behavioral: Negative.       Objective:     Visit Vitals  LMP 05/07/2023       Physical Exam  Neurological:      Mental Status: She is alert and oriented to person, place, and time.   Psychiatric:         Mood and Affect: Mood normal.       Labs Reviewed:     Hematology:  Lab Results   Component Value Date    WBC 6.39 05/22/2023    HGB 14.0 05/22/2023    HCT 43.5 05/22/2023     05/22/2023     Chemistry:  Lab Results   Component Value Date     05/22/2023    K 4.3 05/22/2023    CHLORIDE 107 05/22/2023    BUN 15.2 05/22/2023    CREATININE 0.72 05/22/2023    EGFRNORACEVR >60 05/22/2023    GLUCOSE 86 05/22/2023    CALCIUM 9.6 05/22/2023    ALKPHOS 95 05/22/2023    LABPROT 7.7 05/22/2023    ALBUMIN 4.1 05/22/2023    BILIDIR 0.2 07/29/2020    IBILI 0.30 07/29/2020    AST 15 05/22/2023    ALT 9 05/22/2023     No results found for: HGBA1C, MICROALBCREA     Lipid Panel:  Lab Results   Component Value Date    CHOL 153 05/22/2023    HDL 44 05/22/2023    LDL 93.00 05/22/2023    TRIG 81 05/22/2023    TOTALCHOLEST 3 05/22/2023     Thyroid:  Lab Results   Component Value Date    TSH 1.448 05/22/2023     Urine:  Lab Results   Component Value Date    APPEARANCEUA Clear 07/29/2020    PHUA 5.5 07/29/2020    PROTEINUA Negative 07/29/2020    LEUKOCYTESUR Negative 07/29/2020    RBCUA None Seen 07/29/2020    WBCUA 0-2 07/29/2020    BACTERIA None  Seen 07/29/2020        Assessment:       ICD-10-CM ICD-9-CM   1. Moderate persistent asthma without complication  J45.40 493.90   2. Severe episode of recurrent major depressive disorder, without psychotic features  F33.2 296.33        Plan:     1. Moderate persistent asthma without complication  Continue Advair, Albuterol  PFTs ordered    2. Severe episode of recurrent major depressive disorder, without psychotic features  Followed by mental health provider  Continue medications as prescribed      Follow up in about 6 months (around 12/15/2023) for Labs. In addition to their scheduled follow up, the patient has also been instructed to follow up on as needed basis.     Laurel Brink, MAR

## 2023-06-20 ENCOUNTER — DOCUMENTATION ONLY (OUTPATIENT)
Dept: ADMINISTRATIVE | Facility: HOSPITAL | Age: 28
End: 2023-06-20
Payer: MEDICAID

## 2023-07-10 ENCOUNTER — PATIENT MESSAGE (OUTPATIENT)
Dept: ADMINISTRATIVE | Facility: HOSPITAL | Age: 28
End: 2023-07-10
Payer: MEDICAID

## 2023-10-16 ENCOUNTER — PATIENT MESSAGE (OUTPATIENT)
Dept: ADMINISTRATIVE | Facility: HOSPITAL | Age: 28
End: 2023-10-16
Payer: MEDICAID

## 2024-05-09 ENCOUNTER — OFFICE VISIT (OUTPATIENT)
Dept: GYNECOLOGY | Facility: CLINIC | Age: 29
End: 2024-05-09
Payer: MEDICAID

## 2024-05-09 VITALS
DIASTOLIC BLOOD PRESSURE: 66 MMHG | HEIGHT: 66 IN | BODY MASS INDEX: 18.53 KG/M2 | WEIGHT: 115.31 LBS | OXYGEN SATURATION: 100 % | TEMPERATURE: 99 F | HEART RATE: 61 BPM | SYSTOLIC BLOOD PRESSURE: 102 MMHG | RESPIRATION RATE: 20 BRPM

## 2024-05-09 DIAGNOSIS — Z30.013 ENCOUNTER FOR INITIAL PRESCRIPTION OF INJECTABLE CONTRACEPTIVE: Primary | ICD-10-CM

## 2024-05-09 PROCEDURE — 3078F DIAST BP <80 MM HG: CPT | Mod: CPTII,,, | Performed by: NURSE PRACTITIONER

## 2024-05-09 PROCEDURE — 1159F MED LIST DOCD IN RCRD: CPT | Mod: CPTII,,, | Performed by: NURSE PRACTITIONER

## 2024-05-09 PROCEDURE — 3074F SYST BP LT 130 MM HG: CPT | Mod: CPTII,,, | Performed by: NURSE PRACTITIONER

## 2024-05-09 PROCEDURE — 99215 OFFICE O/P EST HI 40 MIN: CPT | Mod: PBBFAC,25 | Performed by: NURSE PRACTITIONER

## 2024-05-09 PROCEDURE — 99203 OFFICE O/P NEW LOW 30 MIN: CPT | Mod: S$PBB,,, | Performed by: NURSE PRACTITIONER

## 2024-05-09 PROCEDURE — 3008F BODY MASS INDEX DOCD: CPT | Mod: CPTII,,, | Performed by: NURSE PRACTITIONER

## 2024-05-09 PROCEDURE — 96372 THER/PROPH/DIAG INJ SC/IM: CPT | Mod: PBBFAC

## 2024-05-09 RX ORDER — MEDROXYPROGESTERONE ACETATE 150 MG/ML
150 INJECTION, SUSPENSION INTRAMUSCULAR
Status: DISPENSED | OUTPATIENT
Start: 2024-05-09 | End: 2025-08-02

## 2024-05-09 RX ADMIN — MEDROXYPROGESTERONE ACETATE 150 MG: 150 INJECTION, SUSPENSION INTRAMUSCULAR at 01:05

## 2024-05-09 NOTE — PROGRESS NOTES
"[facility] -  Gynecology / Women's Health Clinic      Subjective:       Patient ID: Roseanne Donald is a 29 y.o. female.    Chief Complaint:  Contraception    History of Present Illness  The patient  here for annual exam, however currently on cycle, will proceed with contraception today. Hx of Depo use in the past, desires to restart Depo today. Denies any unprotected intercourse for months as she didn't want pregnancy. Her LMP was 24. Period last 3-4 days and changes pads every 3-4 hours. Denies history of abnormal paps. Last pap .  Denies breast or urinary complaints. Denies pelvic pain, abnormal bleeding or discharge. Pt reports no STIs in the past and no concerns. Quit tobacco use. Dep. screening 0. Denies fly hx of ovarian, uterine or colon cancer. PGM with breast cancer.    GYN & OB History  Patient's last menstrual period was 2024 (exact date).   Date of Last Pap: 2016    Review of patient's allergies indicates:  No Known Allergies  Past Medical History:   Diagnosis Date    Asthma     Herpes simplex virus (HSV) infection      OB History    Para Term  AB Living   2 1           SAB IAB Ectopic Multiple Live Births                  # Outcome Date GA Lbr Raheel/2nd Weight Sex Type Anes PTL Lv   2             1 Para                 Review of Systems  Review of Systems    Negative except for pertinent findings for positives per HPI     Objective:    Physical Exam    /66 (BP Location: Right arm, Patient Position: Sitting, BP Method: Medium (Automatic))   Pulse 61   Temp 98.6 °F (37 °C) (Oral)   Resp 20   Ht 5' 6" (1.676 m)   Wt 52.3 kg (115 lb 4.8 oz)   LMP 2024 (Exact Date)   SpO2 100%   BMI 18.61 kg/m²   GENERAL: Well-developed female. No acute distress.    SKIN: Normal to inspection, warm and intact.  PSYCHIATRIC: Patient is oriented to person, place, and time. Mood and affect are normal.    Assessment:         ICD-10-CM " ICD-9-CM   1. Encounter for initial prescription of injectable contraceptive  Z30.013 V25.02     Plan:   Roseanne was seen today for contraception.    Diagnoses and all orders for this visit:    Encounter for initial prescription of injectable contraceptive  -     Ambulatory referral/consult to Gynecology  -     POCT urine pregnancy  -     medroxyPROGESTERone (DEPO-PROVERA) injection 150 mg    UPT-neg, denies engaging in unprotected intercourse and no unprotected intercourse since LMP.  Depo ordered  Discussed long term use of Depo can cause bone weakening. Diet high in calcium and vit. D. Pt instructed to increase dietary calcium and vit. D intake. Encouraged dietary calcium rich foods like broccoli, mustard, turnip greens, oranges, dairy products, sardines, salmon, and almonds. Encouraged Vit. D absorption with 30 mins in sun 5 days/week and fatty fish. Increase muscle strengthening and aerobic exercise (walking, climbing, bicycle) at least 30 mins for 3 times/week. Handouts provided.  Discussed risk and benefits of Depo to include weight gain and amenorrhea. Discussed delayed return of ovulation and cycles with Depo, encouraged to stop Depo at least 1 year prior to decision to conceive. Also discussed it can take up to 3-4 shots to regulate cycles with Depo during this time may experience irregular or prolonged bleeding. Pt verbalized understanding.    Return on 5/24 for annual exam.  Follow up for annual exam.

## 2024-05-09 NOTE — PROGRESS NOTES
Gave depo to patient in her right dorsal gluteal. Patient tolerated well and left with no concerns.

## 2024-06-10 ENCOUNTER — OFFICE VISIT (OUTPATIENT)
Dept: GYNECOLOGY | Facility: CLINIC | Age: 29
End: 2024-06-10
Payer: MEDICAID

## 2024-06-10 VITALS
HEIGHT: 66 IN | DIASTOLIC BLOOD PRESSURE: 70 MMHG | SYSTOLIC BLOOD PRESSURE: 108 MMHG | TEMPERATURE: 98 F | HEART RATE: 77 BPM | OXYGEN SATURATION: 100 % | RESPIRATION RATE: 20 BRPM | BODY MASS INDEX: 17.84 KG/M2 | WEIGHT: 111 LBS

## 2024-06-10 DIAGNOSIS — Z11.3 ROUTINE SCREENING FOR STI (SEXUALLY TRANSMITTED INFECTION): ICD-10-CM

## 2024-06-10 DIAGNOSIS — Z12.4 PAP SMEAR FOR CERVICAL CANCER SCREENING: Primary | ICD-10-CM

## 2024-06-10 DIAGNOSIS — B00.9 HSV INFECTION: ICD-10-CM

## 2024-06-10 LAB
C TRACH DNA SPEC QL NAA+PROBE: NOT DETECTED
CLUE CELLS VAG QL WET PREP: NORMAL
N GONORRHOEA DNA SPEC QL NAA+PROBE: NOT DETECTED
SOURCE (OHS): NORMAL
T VAGINALIS VAG QL WET PREP: NORMAL
WBC #/AREA VAG WET PREP: NORMAL
YEAST SPEC QL WET PREP: NORMAL

## 2024-06-10 PROCEDURE — 87625 HPV TYPES 16 & 18 ONLY: CPT | Mod: 59 | Performed by: NURSE PRACTITIONER

## 2024-06-10 PROCEDURE — 1159F MED LIST DOCD IN RCRD: CPT | Mod: CPTII,,, | Performed by: NURSE PRACTITIONER

## 2024-06-10 PROCEDURE — 99214 OFFICE O/P EST MOD 30 MIN: CPT | Mod: PBBFAC | Performed by: NURSE PRACTITIONER

## 2024-06-10 PROCEDURE — 99395 PREV VISIT EST AGE 18-39: CPT | Mod: S$PBB,,, | Performed by: NURSE PRACTITIONER

## 2024-06-10 PROCEDURE — 3078F DIAST BP <80 MM HG: CPT | Mod: CPTII,,, | Performed by: NURSE PRACTITIONER

## 2024-06-10 PROCEDURE — 87210 SMEAR WET MOUNT SALINE/INK: CPT | Performed by: NURSE PRACTITIONER

## 2024-06-10 PROCEDURE — 3008F BODY MASS INDEX DOCD: CPT | Mod: CPTII,,, | Performed by: NURSE PRACTITIONER

## 2024-06-10 PROCEDURE — 88174 CYTOPATH C/V AUTO IN FLUID: CPT | Performed by: NURSE PRACTITIONER

## 2024-06-10 PROCEDURE — 87491 CHLMYD TRACH DNA AMP PROBE: CPT | Performed by: NURSE PRACTITIONER

## 2024-06-10 PROCEDURE — 87591 N.GONORRHOEAE DNA AMP PROB: CPT | Performed by: NURSE PRACTITIONER

## 2024-06-10 PROCEDURE — 87624 HPV HI-RISK TYP POOLED RSLT: CPT | Performed by: NURSE PRACTITIONER

## 2024-06-10 PROCEDURE — 3074F SYST BP LT 130 MM HG: CPT | Mod: CPTII,,, | Performed by: NURSE PRACTITIONER

## 2024-06-10 PROCEDURE — 87625 HPV TYPES 16 & 18 ONLY: CPT | Performed by: NURSE PRACTITIONER

## 2024-06-10 RX ORDER — VALACYCLOVIR HYDROCHLORIDE 500 MG/1
500 TABLET, FILM COATED ORAL 2 TIMES DAILY
Qty: 30 TABLET | Refills: 3 | Status: SHIPPED | OUTPATIENT
Start: 2024-06-10

## 2024-06-10 NOTE — LETTER
Helene 10, 2024      [facility]  [address] [address]  Phone: [phone]       Patient: Roseanne Donald   YOB: 1995  Date of Visit: 06/10/2024    To Whom It May Concern:    Darron Donald  was at [facility] on 06/10/2024. The patient may return to work/school on 06/10/2024 with no restrictions. If you have any questions or concerns, or if I can be of further assistance, please do not hesitate to contact me.    Sincerely,    Karlos Dye NP

## 2024-06-10 NOTE — PROGRESS NOTES
"  [facility] -  Gynecology / Women's Health Clinic      Subjective:       Patient ID: Roseanne Donald is a 29 y.o. female.    Chief Complaint:  Gynecologic Exam    History of Present Illness  The patient  here for annual exam. Restarted Depo in May 2024. Her LMP was 24. Period last 10 days and changes pads every 3-4 hours. Denies history of abnormal paps. Last pap .  Denies breast or urinary complaints. Denies pelvic pain, abnormal bleeding or discharge. Pt reports genital HSV with occ outbreaks, last 3 months ago. Desires STI screening today.   Quit tobacco use. Dep. screening 0. Denies fly hx of ovarian, uterine or colon cancer. PGM with breast cancer.     GYN & OB History  Patient's last menstrual period was 2024.   Date of Last Pap: 2016    Review of patient's allergies indicates:  No Known Allergies  Past Medical History:   Diagnosis Date    Asthma     Herpes simplex virus (HSV) infection      OB History    Para Term  AB Living   2 1           SAB IAB Ectopic Multiple Live Births                  # Outcome Date GA Lbr Raheel/2nd Weight Sex Type Anes PTL Lv   2             1 Para                 Review of Systems  Review of Systems    Negative except for pertinent findings for positives per HPI     Objective:    Physical Exam    /70 (BP Location: Right arm, Patient Position: Sitting, BP Method: Medium (Automatic))   Pulse 77   Temp 98.4 °F (36.9 °C) (Oral)   Resp 20   Ht 5' 6" (1.676 m)   Wt 50.3 kg (111 lb)   LMP 2024   SpO2 100%   BMI 17.92 kg/m²   GENERAL: Well-developed female. No acute distress.    SKIN: Normal to inspection, warm and intact.  BREASTS: No rashes or erythema. No masses, lumps, discharge, tenderness.  VULVA: General appearance normal; external genitalia with no lesions or erythema.  VAGINA: Mucosa/vaginal vault pink, no abnormal discharge or lesions.  CERVIX: Pink, parous appearing os, friable, no erythema " or abnormal discharge.  BIMANUAL EXAM: reveals 8 week-sized uterus. The uterus is regular, mobile, and non tender. Carlos adnexa reveal no evidence of masses, tenderness.  PSYCHIATRIC: Patient is oriented to person, place, and time. Mood and affect are normal.      Assessment:         ICD-10-CM ICD-9-CM   1. Pap smear for cervical cancer screening  Z12.4 V76.2   2. Routine screening for STI (sexually transmitted infection)  Z11.3 V74.5   3. HSV infection  B00.9 054.9     Plan:   Roseanne was seen today for gynecologic exam.    Diagnoses and all orders for this visit:    Pap smear for cervical cancer screening  -     Liquid-Based Pap Smear, Screening    Routine screening for STI (sexually transmitted infection)  -     Chlamydia/GC, PCR  -     Wet Prep, Genital    HSV infection  -     valACYclovir (VALTREX) 500 MG tablet; Take 1 tablet (500 mg total) by mouth 2 (two) times daily. Take for episodic therapy x3 days at first signs of outbreak. Drink plenty of fluids.     Pap today  STI screen  Valtrex for episodic treatment HSV  Follow up in about 1 year (around 6/10/2025) for annual exam.

## 2024-06-13 LAB
HIGH RISK HPV 16: NEGATIVE
HIGH RISK HPV 18/45: NEGATIVE
HIGH RISK HPV: POSITIVE
PSYCHE PATHOLOGY RESULT: NORMAL

## 2024-06-14 ENCOUNTER — TELEPHONE (OUTPATIENT)
Dept: GYNECOLOGY | Facility: CLINIC | Age: 29
End: 2024-06-14
Payer: MEDICAID

## 2024-06-14 NOTE — TELEPHONE ENCOUNTER
Informed pt of pap results. ASCUS and HPV positive OHR. Per ASCCP guidelines, repeat pap in 1 year.       Please schedule pt in colpo clinic.

## 2024-07-25 ENCOUNTER — TELEPHONE (OUTPATIENT)
Dept: GYNECOLOGY | Facility: CLINIC | Age: 29
End: 2024-07-25
Payer: MEDICAID

## 2024-07-25 NOTE — TELEPHONE ENCOUNTER
Called and confirmed colpo appt for tomorrow, just finished a period so shouldn't be bleeding tomorrow, however, did instruct that if she did start vaginal bleeding that we would have to reschedule, verbalized understanding

## 2024-07-26 ENCOUNTER — PROCEDURE VISIT (OUTPATIENT)
Dept: GYNECOLOGY | Facility: CLINIC | Age: 29
End: 2024-07-26
Payer: MEDICAID

## 2024-07-26 VITALS
BODY MASS INDEX: 17.74 KG/M2 | DIASTOLIC BLOOD PRESSURE: 67 MMHG | HEART RATE: 72 BPM | TEMPERATURE: 98 F | SYSTOLIC BLOOD PRESSURE: 104 MMHG | HEIGHT: 66 IN | RESPIRATION RATE: 18 BRPM | OXYGEN SATURATION: 100 % | WEIGHT: 110.38 LBS

## 2024-07-26 DIAGNOSIS — N87.9 CERVICAL DYSPLASIA: Primary | ICD-10-CM

## 2024-07-26 NOTE — PROCEDURES
Colposcopy    Date/Time: 2024 10:15 AM    Performed by: Edwige Fitzpatrick MD  Authorized by: Edwige Fitzpatrick MD    Consent obatined:  Prior to procedure the appropriate consent was completed and verified  Timeout:Immediately prior to procedure a time out was called to verify the correct patient, procedure, equipment, support staff and site/side marked as required  Assistants?: No      Colposcopy Site:  Cervix  Position:  Supine  Acrowhite Lesion? Yes (6:00 extending into cervical os, 12:00 extending into cervical os, 12:00 isolated lesion on ectocervix)    Atypical Vessels: No    Transformation Zone Adequate?: Yes    Biopsy?: Yes         Location:  Cervix ((6 00 and 12 00))  ECC Performed?: Yes    LEEP Performed?: No    Estimated blood loss (cc):  5   Patient tolerated the procedure well with no immediate complications.   Post-operative instructions were provided for the patient.   Patient was discharged and will follow up if any complications occur    [facility] COLPOSCOPY CLINIC NOTE     Roseanne Donald is a 29 y.o.  referred to [facility] colposcopy clinic from NP.      Pap History:   2016: NILM  2024: ASCUS; OHR +    Sexual History:    Number of sex partners: Current 1 male partner of 6 months; Lifetime >100  Contraception:  PLAN CONTRACEPTION: Depo-Provera  Future fertility desires: YES/NO: Yes  Smoking History: History smoking: the patient is a former smoker who quit smoking 1 year ago; previously smoked for 10 years  HIV status: negative  STI Hx: Chlamydia  HPV vaccination status: YES/NO: No; desires today    GYN History:  Last menstrual period: 2024    Gynecology  OB History          2    Para   1    Term                AB        Living             SAB        IAB        Ectopic        Multiple        Live Births                    Past Medical History:   Diagnosis Date    Abnormal Pap smear of cervix     Asthma     Herpes simplex virus (HSV) infection       History reviewed. No pertinent  surgical history.     Social History     Tobacco Use    Smoking status: Former     Current packs/day: 0.00     Types: Cigarettes     Quit date: 2023     Years since quittin.1     Passive exposure: Past    Smokeless tobacco: Never    Tobacco comments:     Smokes 2-3 times a day   Substance Use Topics    Alcohol use: Not Currently    Drug use: Not Currently     Types: Methamphetamines, Fentanyl     Comment: in pass     Review of Systems  Pertinent items are noted in HPI.     Objective:     Wt 50.1 kg (110 lb 6.4 oz)   LMP 2024 (Exact Date)   BMI 17.82 kg/m²   Physical Exam:  Gen: Well-nourished, well-developed female appearing stated age. Alert, cooperative, in no acute distress.    SEE COLPOSCOPY PROCEDURE NOTE    Assessment:       29 y.o.  here for:  1. Cervical dysplasia  POCT urine pregnancy    Specimen to Pathology Gynecology and Obstetrics        COLPOSCOPIC IMPRESSION: Moderate to severe dysplasia    Plan:     Problem List Items Addressed This Visit    None  Visit Diagnoses       Cervical dysplasia    -  Primary    Relevant Orders    POCT urine pregnancy    Specimen to Pathology Gynecology and Obstetrics          plan; follow up: Notify patient and PCP of test results.  Arrange additional treatment or follow up at that time.    Edwige Fitzpatrick MD  [employer], PGY-2  11:00 AM 2024

## 2024-07-26 NOTE — LETTER
July 26, 2024      [facility]  [address] [address]  Phone: [phone]       Patient: Roseanne Donald   YOB: 1995  Date of Visit: 07/26/2024    To Whom It May Concern:    Darron Donald  was at [facility] on 07/26/2024. The patient may return to work/school on 07/26/24 with no restrictions. If you have any questions or concerns, or if I can be of further assistance, please do not hesitate to contact me.    Sincerely,  RACQUEL Fitzpatrick MD

## 2024-07-26 NOTE — PROCEDURES
Administered gardasil vaccine in left deltoid, per MD orders. Pt tolerated well and was instructed to wait 15 minutes clinic shot time. Pt stated she could not wait due too work. Informed pt if she has any adverse reactions she needs to go to the nearest ER.    Pt verbalized understanding.

## 2024-07-29 ENCOUNTER — TELEPHONE (OUTPATIENT)
Dept: GYNECOLOGY | Facility: CLINIC | Age: 29
End: 2024-07-29
Payer: MEDICAID

## 2024-07-29 NOTE — TELEPHONE ENCOUNTER
-  ----- Message -----  From: Philipp Vanessa  Sent: 7/29/2024   7:50 AM CDT  To: [facility] Gynecology Clinical Support Staff  Subject: sick after colpo                                 The above patient said she is not feeling the best since she had her colpo on Friday. Says she is feverish. Please advise, thanks      Patient has fever, headache, body aches, the discharge is different than her normal but not foul. Suggested that she go to urgent care clinic immediately to test for COVID, has an appt in a little while at [facility] on [address] .  She just wanted to make sure that the flu symptoms were no related to the colpo on Friday.  She will send a message to let me know if she tests positive just to give me more info  
Cognitively Impaired

## 2024-08-04 ENCOUNTER — TELEPHONE (OUTPATIENT)
Dept: GYNECOLOGY | Facility: CLINIC | Age: 29
End: 2024-08-04
Payer: MEDICAID

## 2024-10-10 ENCOUNTER — CLINICAL SUPPORT (OUTPATIENT)
Dept: GYNECOLOGY | Facility: CLINIC | Age: 29
End: 2024-10-10
Payer: MEDICAID

## 2024-10-10 DIAGNOSIS — Z30.42 ENCOUNTER FOR DEPO-PROVERA CONTRACEPTION: Primary | ICD-10-CM

## 2024-10-10 DIAGNOSIS — Z23 NEED FOR HPV VACCINE: ICD-10-CM

## 2024-10-10 LAB
B-HCG UR QL: NEGATIVE
CTP QC/QA: YES

## 2024-10-10 PROCEDURE — 90471 IMMUNIZATION ADMIN: CPT | Mod: PBBFAC

## 2024-10-10 PROCEDURE — 81025 URINE PREGNANCY TEST: CPT | Mod: PBBFAC

## 2024-10-10 PROCEDURE — 90651 9VHPV VACCINE 2/3 DOSE IM: CPT | Mod: PBBFAC

## 2024-10-10 PROCEDURE — 99211 OFF/OP EST MAY X REQ PHY/QHP: CPT | Mod: PBBFAC

## 2024-10-10 RX ORDER — MEDROXYPROGESTERONE ACETATE 150 MG/ML
INJECTION, SUSPENSION INTRAMUSCULAR
Status: COMPLETED
Start: 2024-10-10 | End: 2024-10-10

## 2024-10-10 RX ORDER — MEDROXYPROGESTERONE ACETATE 150 MG/ML
150 INJECTION, SUSPENSION INTRAMUSCULAR
Status: ACTIVE | OUTPATIENT
Start: 2024-10-10

## 2024-10-10 RX ADMIN — MEDROXYPROGESTERONE ACETATE 150 MG: 150 INJECTION, SUSPENSION INTRAMUSCULAR at 12:10

## 2024-10-10 RX ADMIN — HUMAN PAPILLOMAVIRUS 9-VALENT VACCINE, RECOMBINANT 0.5 ML: 30; 40; 60; 40; 20; 20; 20; 20; 20 INJECTION, SUSPENSION INTRAMUSCULAR at 11:10

## 2024-10-10 NOTE — PROGRESS NOTES
UPT negative.     Depo-Provera injection administered per Marnie Everett NP order in left dorsalgluteal. Patient tolerated well and left in stable condition.

## 2024-10-10 NOTE — PROGRESS NOTES
2nd dose of Gardasil 9 vaccine administered per Dr. Sosa Murali order. Patient tolerated well and left in stable condition.

## 2025-04-23 ENCOUNTER — TELEPHONE (OUTPATIENT)
Dept: GYNECOLOGY | Facility: CLINIC | Age: 30
End: 2025-04-23
Payer: MEDICAID

## 2025-04-25 ENCOUNTER — OFFICE VISIT (OUTPATIENT)
Dept: GYNECOLOGY | Facility: CLINIC | Age: 30
End: 2025-04-25
Payer: MEDICAID

## 2025-04-25 VITALS
HEART RATE: 65 BPM | SYSTOLIC BLOOD PRESSURE: 108 MMHG | TEMPERATURE: 99 F | BODY MASS INDEX: 18.91 KG/M2 | RESPIRATION RATE: 20 BRPM | OXYGEN SATURATION: 100 % | HEIGHT: 66 IN | WEIGHT: 117.63 LBS | DIASTOLIC BLOOD PRESSURE: 71 MMHG

## 2025-04-25 DIAGNOSIS — N63.23 MASS OF LOWER OUTER QUADRANT OF LEFT BREAST: Primary | ICD-10-CM

## 2025-04-25 DIAGNOSIS — N63.13 MASS OF LOWER OUTER QUADRANT OF RIGHT BREAST: ICD-10-CM

## 2025-04-25 PROCEDURE — 99215 OFFICE O/P EST HI 40 MIN: CPT | Mod: PBBFAC | Performed by: NURSE PRACTITIONER

## 2025-04-25 NOTE — PROGRESS NOTES
"  [facility] -  Gynecology / Women's Health Clinic      Subjective:       Patient ID: Roseanne Donald is a 30 y.o. female.    Chief Complaint:  Breast Mass    History of Present Illness  Pt presents today for Rt breast lump noted per pt since 25. Pt states she had associated nipple discharge yellow tinged with some bleeding x1 day. She states her Rt breast feels sore. Pt admits to heavy caffeine use with Dr. Pepper, coffee and tea.    GYN & OB History  No LMP recorded. (Menstrual status: Birth Control).   Date of Last Pap: 2024    Review of patient's allergies indicates:  No Known Allergies  Past Medical History:   Diagnosis Date    Abnormal Pap smear of cervix     Asthma     Herpes simplex virus (HSV) infection      OB History    Para Term  AB Living   2 1       SAB IAB Ectopic Multiple Live Births             # Outcome Date GA Lbr Raheel/2nd Weight Sex Type Anes PTL Lv   2             1 Para                 Review of Systems  Review of Systems    Negative except for pertinent findings for positives per HPI     Objective:    Physical Exam    /71 (BP Location: Left arm, Patient Position: Sitting)   Pulse 65   Temp 98.5 °F (36.9 °C) (Oral)   Resp 20   Ht 5' 6" (1.676 m)   Wt 53.3 kg (117 lb 9.6 oz)   SpO2 100%   BMI 18.98 kg/m²   GENERAL: Well-developed female. No acute distress.    SKIN: Normal to inspection, warm and intact.  BREASTS: Lump RT breast @7 o'clock approx 1-3 cm from nipple, no nipple discharge noted today, Lump LT breast @ 4-5 o'clock approx 4-6 cm from nipple  PSYCHIATRIC: Patient is oriented to person, place, and time. Mood and affect are normal.    Assessment:         ICD-10-CM ICD-9-CM   1. Mass of lower outer quadrant of left breast  N63.23 611.72   2. Mass of lower outer quadrant of right breast  N63.13 611.72     Plan:   Roseanne was seen today for breast mass.    Diagnoses and all orders for this visit:    Mass of lower outer " quadrant of left breast  -     Cancel: Mammo Digital Diagnostic Bilat with Markus; Future  -     US Breast Left Limited; Future  -     Cancel: Mammo Digital Diagnostic Bilat with Markus; Future  -     Mammo Digital Diagnostic Bilat with Markus; Future    Mass of lower outer quadrant of right breast  -     Cancel: Mammo Digital Diagnostic Bilat with Markus; Future  -     US Breast Right Limited; Future  -     Cancel: Mammo Digital Diagnostic Bilat with Markus; Future  -     Mammo Digital Diagnostic Bilat with Markus; Future    Carlos diagnostic MG with carlos breast uls  Pt palpated Rt lump, provider palpated RT and LT lump.  Follow up for annual exam.

## 2025-06-16 ENCOUNTER — OFFICE VISIT (OUTPATIENT)
Dept: GYNECOLOGY | Facility: CLINIC | Age: 30
End: 2025-06-16
Payer: MEDICAID

## 2025-06-16 VITALS
TEMPERATURE: 99 F | HEIGHT: 66 IN | HEART RATE: 67 BPM | DIASTOLIC BLOOD PRESSURE: 77 MMHG | OXYGEN SATURATION: 100 % | RESPIRATION RATE: 20 BRPM | SYSTOLIC BLOOD PRESSURE: 113 MMHG | BODY MASS INDEX: 18.99 KG/M2 | WEIGHT: 118.19 LBS

## 2025-06-16 DIAGNOSIS — B00.9 HSV INFECTION: ICD-10-CM

## 2025-06-16 DIAGNOSIS — Z12.4 PAP SMEAR FOR CERVICAL CANCER SCREENING: Primary | ICD-10-CM

## 2025-06-16 DIAGNOSIS — Z11.3 ROUTINE SCREENING FOR STI (SEXUALLY TRANSMITTED INFECTION): ICD-10-CM

## 2025-06-16 LAB
C TRACH DNA SPEC QL NAA+PROBE: NOT DETECTED
CLUE CELLS VAG QL WET PREP: NORMAL
N GONORRHOEA DNA SPEC QL NAA+PROBE: NOT DETECTED
SPECIMEN SOURCE: NORMAL
T VAGINALIS VAG QL WET PREP: NORMAL
WBC #/AREA VAG WET PREP: NORMAL
YEAST SPEC QL WET PREP: NORMAL

## 2025-06-16 PROCEDURE — 99214 OFFICE O/P EST MOD 30 MIN: CPT | Mod: PBBFAC | Performed by: NURSE PRACTITIONER

## 2025-06-16 PROCEDURE — 3078F DIAST BP <80 MM HG: CPT | Mod: CPTII,,, | Performed by: NURSE PRACTITIONER

## 2025-06-16 PROCEDURE — 3008F BODY MASS INDEX DOCD: CPT | Mod: CPTII,,, | Performed by: NURSE PRACTITIONER

## 2025-06-16 PROCEDURE — 87624 HPV HI-RISK TYP POOLED RSLT: CPT | Performed by: NURSE PRACTITIONER

## 2025-06-16 PROCEDURE — 99395 PREV VISIT EST AGE 18-39: CPT | Mod: S$PBB,,, | Performed by: NURSE PRACTITIONER

## 2025-06-16 PROCEDURE — 87591 N.GONORRHOEAE DNA AMP PROB: CPT | Performed by: NURSE PRACTITIONER

## 2025-06-16 PROCEDURE — 3074F SYST BP LT 130 MM HG: CPT | Mod: CPTII,,, | Performed by: NURSE PRACTITIONER

## 2025-06-16 PROCEDURE — 1159F MED LIST DOCD IN RCRD: CPT | Mod: CPTII,,, | Performed by: NURSE PRACTITIONER

## 2025-06-16 PROCEDURE — 88174 CYTOPATH C/V AUTO IN FLUID: CPT | Performed by: NURSE PRACTITIONER

## 2025-06-16 PROCEDURE — 87210 SMEAR WET MOUNT SALINE/INK: CPT | Performed by: NURSE PRACTITIONER

## 2025-06-16 RX ORDER — VALACYCLOVIR HYDROCHLORIDE 500 MG/1
500 TABLET, FILM COATED ORAL 2 TIMES DAILY
Qty: 30 TABLET | Refills: 3 | Status: SHIPPED | OUTPATIENT
Start: 2025-06-16

## 2025-06-16 NOTE — PROGRESS NOTES
[facility] -  Gynecology / Women's Health Clinic      Subjective:       Patient ID: Roseanne Donald is a 30 y.o. female.    Chief Complaint:  Gynecologic Exam    History of Present Illness  The patient  here for annual exam. Last Depo was 10/2024. Admits spotting in  x3-4 days. Denies history of abnormal paps. Last pap -ASCUS and HPV positive OHR. Pt underwent colpo with results below.  Denies breast or urinary complaints. Denies pelvic pain, abnormal bleeding or discharge. Pt reports genital HSV with occ outbreaks, takes episodic Valtrex. Desires STI screening today. Quit tobacco use. Dep. screening 0. Denies fly hx of ovarian, uterine or colon cancer. PGM with breast cancer.      Pap Hx:  2016: NILM  2024: ASCUS; OHR      Colposcopy results:  1. Cervix, 12:00, biopsy:   - Low-grade squamous intraepithelial lesion (LSIL).             2. Cervix, 12:00; transformation zone, biopsy:   - Low-grade squamous intraepithelial lesion (LSIL).        - Mild chronic cervicitis.          3. Cervix, 6:00, biopsy:   - Low-grade squamous intraepithelial lesion (LSIL).    - Mild chronic cervicitis.            4. Endocervix, ECC:   - Fragments of benign endocervical mucosa.         GYN & OB History  Patient's last menstrual period was 2025 (exact date).   Date of Last Pap: 2024    Review of patient's allergies indicates:  No Known Allergies  Past Medical History:   Diagnosis Date    Abnormal Pap smear of cervix     Asthma     Herpes simplex virus (HSV) infection      OB History    Para Term  AB Living   2 1       SAB IAB Ectopic Multiple Live Births             # Outcome Date GA Lbr Raheel/2nd Weight Sex Type Anes PTL Lv   2             1 Para                 Review of Systems  Review of Systems    Negative except for pertinent findings for positives per HPI     Objective:    Physical Exam    /77 (BP Location: Left arm, Patient Position: Sitting)   Pulse  "67   Temp 98.6 °F (37 °C) (Oral)   Resp 20   Ht 5' 6" (1.676 m)   Wt 53.6 kg (118 lb 3.2 oz)   LMP 06/09/2025 (Exact Date)   SpO2 100%   BMI 19.08 kg/m²   GENERAL: Well-developed female. No acute distress.    SKIN: Normal to inspection, warm and intact.  BREASTS: Lump RT breast @7 o'clock approx 1-3 cm from nipple, no nipple discharge noted today, Lump LT breast @ 4-5 o'clock approx 4-6 cm from nipple   VULVA: General appearance normal; external genitalia with no lesions or erythema.  VAGINA: Mucosa/vaginal vault pink, no abnormal discharge or lesions.  CERVIX: Pink, nulliparous appearing os, friable, no erythema or abnormal discharge.  BIMANUAL EXAM: reveals a 8 week-sized uterus. The uterus is non tender. Carlos adnexa reveal no tenderness.  PSYCHIATRIC: Patient is oriented to person, place, and time. Mood and affect are normal.    Assessment:         ICD-10-CM ICD-9-CM   1. Pap smear for cervical cancer screening  Z12.4 V76.2   2. Routine screening for STI (sexually transmitted infection)  Z11.3 V74.5   3. HSV infection  B00.9 054.9     Plan:   Roseanne was seen today for gynecologic exam.    Diagnoses and all orders for this visit:    Pap smear for cervical cancer screening  -     Liquid-Based Pap Smear, Screening    Routine screening for STI (sexually transmitted infection)  -     Chlamydia/GC, PCR  -     Wet Prep, Genital    HSV infection  -     valACYclovir (VALTREX) 500 MG tablet; Take 1 tablet (500 mg total) by mouth 2 (two) times daily. Take for episodic therapy x3 days at first signs of outbreak. Drink plenty of fluids.    Pap today  Keep MG appt on 7/7/25  Wet prep and g/c  Valtrex PRN  Follow up in about 1 year (around 6/16/2026) for annual exam.    "

## 2025-06-19 LAB
HIGH RISK HPV: NEGATIVE
PSYCHE PATHOLOGY RESULT: NORMAL

## 2025-07-07 ENCOUNTER — HOSPITAL ENCOUNTER (OUTPATIENT)
Dept: RADIOLOGY | Facility: HOSPITAL | Age: 30
Discharge: HOME | End: 2025-07-07
Attending: NURSE PRACTITIONER
Payer: MEDICAID

## 2025-07-07 DIAGNOSIS — N63.13 MASS OF LOWER OUTER QUADRANT OF RIGHT BREAST: ICD-10-CM

## 2025-07-07 DIAGNOSIS — N63.23 MASS OF LOWER OUTER QUADRANT OF LEFT BREAST: ICD-10-CM

## 2025-07-07 PROCEDURE — 76642 ULTRASOUND BREAST LIMITED: CPT | Mod: TC,50

## 2025-07-07 PROCEDURE — 77062 BREAST TOMOSYNTHESIS BI: CPT | Mod: 26,,, | Performed by: RADIOLOGY

## 2025-07-07 PROCEDURE — 77062 BREAST TOMOSYNTHESIS BI: CPT | Mod: TC

## 2025-07-07 PROCEDURE — 77066 DX MAMMO INCL CAD BI: CPT | Mod: 26,,, | Performed by: RADIOLOGY

## 2025-07-07 PROCEDURE — 76642 ULTRASOUND BREAST LIMITED: CPT | Mod: 26,50,, | Performed by: RADIOLOGY

## 2025-07-23 ENCOUNTER — HOSPITAL ENCOUNTER (OUTPATIENT)
Dept: RADIOLOGY | Facility: HOSPITAL | Age: 30
Discharge: HOME | End: 2025-07-23
Attending: NURSE PRACTITIONER
Payer: MEDICAID

## 2025-07-23 ENCOUNTER — TELEPHONE (OUTPATIENT)
Dept: RADIOLOGY | Facility: HOSPITAL | Age: 30
End: 2025-07-23
Payer: MEDICAID

## 2025-07-23 DIAGNOSIS — N63.13 MASS OF LOWER OUTER QUADRANT OF RIGHT BREAST: Primary | ICD-10-CM

## 2025-07-23 DIAGNOSIS — N63.13 MASS OF LOWER OUTER QUADRANT OF RIGHT BREAST: ICD-10-CM

## 2025-07-23 DIAGNOSIS — N63.23 MASS OF LOWER OUTER QUADRANT OF LEFT BREAST: ICD-10-CM

## 2025-07-23 PROCEDURE — 19083 BX BREAST 1ST LESION US IMAG: CPT | Mod: LT

## 2025-07-23 PROCEDURE — 77066 DX MAMMO INCL CAD BI: CPT | Mod: TC

## 2025-07-23 PROCEDURE — 19084 BX BREAST ADD LESION US IMAG: CPT

## 2025-07-23 RX ORDER — LIDOCAINE HYDROCHLORIDE 20 MG/ML
INJECTION, SOLUTION EPIDURAL; INFILTRATION; INTRACAUDAL; PERINEURAL
Status: DISPENSED
Start: 2025-07-23 | End: 2025-07-23

## 2025-07-23 RX ORDER — LIDOCAINE HCL/EPINEPHRINE/PF 2%-1:200K
VIAL (ML) INJECTION
Status: DISPENSED
Start: 2025-07-23 | End: 2025-07-23

## 2025-07-23 NOTE — PROGRESS NOTES
I notified Roseanne of her [facility] appt date, time and location (Thurs 8/14/25 1pm).

## 2025-07-23 NOTE — PROGRESS NOTES
Referral to [facility] for bilateral enlarging breast masses.  Bilateral US guided biopsies 7/23/25.  Regardless of results, patient would like to discuss removal of masses.

## 2025-07-24 ENCOUNTER — TELEPHONE (OUTPATIENT)
Dept: RADIOLOGY | Facility: HOSPITAL | Age: 30
End: 2025-07-24
Payer: MEDICAID

## 2025-08-14 ENCOUNTER — OFFICE VISIT (OUTPATIENT)
Dept: SURGERY | Facility: CLINIC | Age: 30
End: 2025-08-14
Payer: MEDICAID

## 2025-08-14 VITALS
SYSTOLIC BLOOD PRESSURE: 105 MMHG | BODY MASS INDEX: 18.96 KG/M2 | HEIGHT: 66 IN | DIASTOLIC BLOOD PRESSURE: 70 MMHG | WEIGHT: 118 LBS | OXYGEN SATURATION: 97 % | TEMPERATURE: 98 F | RESPIRATION RATE: 18 BRPM | HEART RATE: 72 BPM

## 2025-08-14 DIAGNOSIS — D24.2 BILATERAL FIBROADENOMAS OF BREASTS: Primary | ICD-10-CM

## 2025-08-14 DIAGNOSIS — D24.1 BILATERAL FIBROADENOMAS OF BREASTS: Primary | ICD-10-CM

## 2025-08-14 DIAGNOSIS — N63.13 MASS OF LOWER OUTER QUADRANT OF RIGHT BREAST: ICD-10-CM

## 2025-08-14 PROCEDURE — 99215 OFFICE O/P EST HI 40 MIN: CPT | Mod: PBBFAC

## 2025-08-14 RX ORDER — SODIUM CHLORIDE 9 MG/ML
INJECTION, SOLUTION INTRAVENOUS CONTINUOUS
OUTPATIENT
Start: 2025-08-14

## 2025-08-14 RX ORDER — CEFAZOLIN SODIUM 2 G/50ML
2 SOLUTION INTRAVENOUS
OUTPATIENT
Start: 2025-08-14

## 2025-08-14 RX ORDER — HEPARIN SODIUM 5000 [USP'U]/ML
5000 INJECTION, SOLUTION INTRAVENOUS; SUBCUTANEOUS ONCE
OUTPATIENT
Start: 2025-08-14